# Patient Record
Sex: FEMALE | Race: WHITE | NOT HISPANIC OR LATINO | Employment: FULL TIME | URBAN - METROPOLITAN AREA
[De-identification: names, ages, dates, MRNs, and addresses within clinical notes are randomized per-mention and may not be internally consistent; named-entity substitution may affect disease eponyms.]

---

## 2017-02-22 ENCOUNTER — RECORDS - HEALTHEAST (OUTPATIENT)
Dept: GENERAL RADIOLOGY | Facility: CLINIC | Age: 47
End: 2017-02-22

## 2017-02-22 ENCOUNTER — OFFICE VISIT - HEALTHEAST (OUTPATIENT)
Dept: FAMILY MEDICINE | Facility: CLINIC | Age: 47
End: 2017-02-22

## 2017-02-22 DIAGNOSIS — M25.511 ACUTE PAIN OF RIGHT SHOULDER: ICD-10-CM

## 2017-02-22 DIAGNOSIS — M75.82 ROTATOR CUFF TENDONITIS, LEFT: ICD-10-CM

## 2017-02-22 DIAGNOSIS — M25.511 PAIN IN RIGHT SHOULDER: ICD-10-CM

## 2017-02-22 DIAGNOSIS — N87.9 DYSPLASIA OF CERVIX: ICD-10-CM

## 2017-02-22 ASSESSMENT — MIFFLIN-ST. JEOR: SCORE: 1697.5

## 2017-02-24 ENCOUNTER — COMMUNICATION - HEALTHEAST (OUTPATIENT)
Dept: FAMILY MEDICINE | Facility: CLINIC | Age: 47
End: 2017-02-24

## 2017-02-24 ENCOUNTER — OFFICE VISIT - HEALTHEAST (OUTPATIENT)
Dept: PHYSICAL THERAPY | Facility: REHABILITATION | Age: 47
End: 2017-02-24

## 2017-02-24 DIAGNOSIS — R29.3 POOR POSTURE: ICD-10-CM

## 2017-02-24 DIAGNOSIS — M25.812 SHOULDER IMPINGEMENT, LEFT: ICD-10-CM

## 2017-02-24 DIAGNOSIS — M62.81 GENERALIZED MUSCLE WEAKNESS: ICD-10-CM

## 2017-02-24 DIAGNOSIS — M77.8 LEFT SHOULDER TENDONITIS: ICD-10-CM

## 2017-02-24 DIAGNOSIS — M25.612 DECREASED ROM OF LEFT SHOULDER: ICD-10-CM

## 2017-02-27 ENCOUNTER — OFFICE VISIT - HEALTHEAST (OUTPATIENT)
Dept: PHYSICAL THERAPY | Facility: REHABILITATION | Age: 47
End: 2017-02-27

## 2017-02-27 DIAGNOSIS — M25.612 DECREASED ROM OF LEFT SHOULDER: ICD-10-CM

## 2017-02-27 DIAGNOSIS — M77.8 LEFT SHOULDER TENDONITIS: ICD-10-CM

## 2017-02-27 DIAGNOSIS — M25.812 SHOULDER IMPINGEMENT, LEFT: ICD-10-CM

## 2017-02-27 DIAGNOSIS — R29.3 POOR POSTURE: ICD-10-CM

## 2017-02-27 DIAGNOSIS — M62.81 GENERALIZED MUSCLE WEAKNESS: ICD-10-CM

## 2017-03-03 ENCOUNTER — OFFICE VISIT - HEALTHEAST (OUTPATIENT)
Dept: PHYSICAL THERAPY | Facility: REHABILITATION | Age: 47
End: 2017-03-03

## 2017-03-03 DIAGNOSIS — M77.8 LEFT SHOULDER TENDONITIS: ICD-10-CM

## 2017-03-03 DIAGNOSIS — R29.3 POOR POSTURE: ICD-10-CM

## 2017-03-03 DIAGNOSIS — M62.81 GENERALIZED MUSCLE WEAKNESS: ICD-10-CM

## 2017-03-03 DIAGNOSIS — M25.812 SHOULDER IMPINGEMENT, LEFT: ICD-10-CM

## 2017-03-03 DIAGNOSIS — M25.612 DECREASED ROM OF LEFT SHOULDER: ICD-10-CM

## 2017-03-06 ENCOUNTER — OFFICE VISIT - HEALTHEAST (OUTPATIENT)
Dept: PHYSICAL THERAPY | Facility: REHABILITATION | Age: 47
End: 2017-03-06

## 2017-03-06 DIAGNOSIS — M25.612 DECREASED ROM OF LEFT SHOULDER: ICD-10-CM

## 2017-03-06 DIAGNOSIS — M62.81 GENERALIZED MUSCLE WEAKNESS: ICD-10-CM

## 2017-03-06 DIAGNOSIS — M25.812 SHOULDER IMPINGEMENT, LEFT: ICD-10-CM

## 2017-03-06 DIAGNOSIS — M77.8 LEFT SHOULDER TENDONITIS: ICD-10-CM

## 2017-03-06 DIAGNOSIS — R29.3 POOR POSTURE: ICD-10-CM

## 2017-03-08 ENCOUNTER — OFFICE VISIT - HEALTHEAST (OUTPATIENT)
Dept: FAMILY MEDICINE | Facility: CLINIC | Age: 47
End: 2017-03-08

## 2017-03-08 DIAGNOSIS — M75.42 SHOULDER IMPINGEMENT SYNDROME, LEFT: ICD-10-CM

## 2017-03-08 ASSESSMENT — MIFFLIN-ST. JEOR: SCORE: 1679.35

## 2017-03-10 ENCOUNTER — OFFICE VISIT - HEALTHEAST (OUTPATIENT)
Dept: PHYSICAL THERAPY | Facility: REHABILITATION | Age: 47
End: 2017-03-10

## 2017-03-10 DIAGNOSIS — M77.8 LEFT SHOULDER TENDONITIS: ICD-10-CM

## 2017-03-10 DIAGNOSIS — M25.612 DECREASED ROM OF LEFT SHOULDER: ICD-10-CM

## 2017-03-10 DIAGNOSIS — M25.812 SHOULDER IMPINGEMENT, LEFT: ICD-10-CM

## 2017-03-10 DIAGNOSIS — M62.81 GENERALIZED MUSCLE WEAKNESS: ICD-10-CM

## 2017-03-10 DIAGNOSIS — R29.3 POOR POSTURE: ICD-10-CM

## 2017-03-13 ENCOUNTER — COMMUNICATION - HEALTHEAST (OUTPATIENT)
Dept: PHYSICAL THERAPY | Facility: REHABILITATION | Age: 47
End: 2017-03-13

## 2017-03-17 ENCOUNTER — OFFICE VISIT - HEALTHEAST (OUTPATIENT)
Dept: PHYSICAL THERAPY | Facility: REHABILITATION | Age: 47
End: 2017-03-17

## 2017-03-17 DIAGNOSIS — M25.812 SHOULDER IMPINGEMENT, LEFT: ICD-10-CM

## 2017-03-17 DIAGNOSIS — M62.81 GENERALIZED MUSCLE WEAKNESS: ICD-10-CM

## 2017-03-17 DIAGNOSIS — M77.8 LEFT SHOULDER TENDONITIS: ICD-10-CM

## 2017-03-17 DIAGNOSIS — M25.612 DECREASED ROM OF LEFT SHOULDER: ICD-10-CM

## 2017-03-17 DIAGNOSIS — R29.3 POOR POSTURE: ICD-10-CM

## 2017-03-20 ENCOUNTER — OFFICE VISIT - HEALTHEAST (OUTPATIENT)
Dept: PHYSICAL THERAPY | Facility: REHABILITATION | Age: 47
End: 2017-03-20

## 2017-03-20 DIAGNOSIS — M62.81 GENERALIZED MUSCLE WEAKNESS: ICD-10-CM

## 2017-03-20 DIAGNOSIS — M77.8 LEFT SHOULDER TENDONITIS: ICD-10-CM

## 2017-03-20 DIAGNOSIS — R29.3 POOR POSTURE: ICD-10-CM

## 2017-03-20 DIAGNOSIS — M25.612 DECREASED ROM OF LEFT SHOULDER: ICD-10-CM

## 2017-03-20 DIAGNOSIS — M25.812 SHOULDER IMPINGEMENT, LEFT: ICD-10-CM

## 2017-03-24 ENCOUNTER — OFFICE VISIT - HEALTHEAST (OUTPATIENT)
Dept: PHYSICAL THERAPY | Facility: REHABILITATION | Age: 47
End: 2017-03-24

## 2017-03-24 ENCOUNTER — RECORDS - HEALTHEAST (OUTPATIENT)
Dept: ADMINISTRATIVE | Facility: OTHER | Age: 47
End: 2017-03-24

## 2017-03-24 DIAGNOSIS — R29.3 POOR POSTURE: ICD-10-CM

## 2017-03-24 DIAGNOSIS — M62.81 GENERALIZED MUSCLE WEAKNESS: ICD-10-CM

## 2017-03-24 DIAGNOSIS — M25.612 DECREASED ROM OF LEFT SHOULDER: ICD-10-CM

## 2017-03-24 DIAGNOSIS — M25.812 SHOULDER IMPINGEMENT, LEFT: ICD-10-CM

## 2017-03-24 DIAGNOSIS — M77.8 LEFT SHOULDER TENDONITIS: ICD-10-CM

## 2017-03-27 ENCOUNTER — OFFICE VISIT - HEALTHEAST (OUTPATIENT)
Dept: PHYSICAL THERAPY | Facility: REHABILITATION | Age: 47
End: 2017-03-27

## 2017-03-27 ENCOUNTER — COMMUNICATION - HEALTHEAST (OUTPATIENT)
Dept: PHYSICAL THERAPY | Facility: REHABILITATION | Age: 47
End: 2017-03-27

## 2017-03-27 DIAGNOSIS — M25.612 DECREASED ROM OF LEFT SHOULDER: ICD-10-CM

## 2017-03-27 DIAGNOSIS — M25.812 SHOULDER IMPINGEMENT, LEFT: ICD-10-CM

## 2017-03-27 DIAGNOSIS — M77.8 LEFT SHOULDER TENDONITIS: ICD-10-CM

## 2017-03-27 DIAGNOSIS — R29.3 POOR POSTURE: ICD-10-CM

## 2017-03-27 DIAGNOSIS — M62.81 GENERALIZED MUSCLE WEAKNESS: ICD-10-CM

## 2017-03-31 ENCOUNTER — RECORDS - HEALTHEAST (OUTPATIENT)
Dept: GENERAL RADIOLOGY | Facility: CLINIC | Age: 47
End: 2017-03-31

## 2017-03-31 ENCOUNTER — OFFICE VISIT - HEALTHEAST (OUTPATIENT)
Dept: PHYSICAL THERAPY | Facility: REHABILITATION | Age: 47
End: 2017-03-31

## 2017-03-31 ENCOUNTER — OFFICE VISIT - HEALTHEAST (OUTPATIENT)
Dept: FAMILY MEDICINE | Facility: CLINIC | Age: 47
End: 2017-03-31

## 2017-03-31 ENCOUNTER — HOSPITAL ENCOUNTER (OUTPATIENT)
Dept: MRI IMAGING | Facility: HOSPITAL | Age: 47
Discharge: HOME | End: 2017-03-31
Attending: FAMILY MEDICINE

## 2017-03-31 DIAGNOSIS — M25.612 DECREASED ROM OF LEFT SHOULDER: ICD-10-CM

## 2017-03-31 DIAGNOSIS — M25.812 SHOULDER IMPINGEMENT, LEFT: ICD-10-CM

## 2017-03-31 DIAGNOSIS — M75.42 SHOULDER IMPINGEMENT SYNDROME, LEFT: ICD-10-CM

## 2017-03-31 DIAGNOSIS — M62.81 GENERALIZED MUSCLE WEAKNESS: ICD-10-CM

## 2017-03-31 DIAGNOSIS — T14.8XXA OTHER INJURY OF UNSPECIFIED BODY REGION: ICD-10-CM

## 2017-03-31 DIAGNOSIS — M77.8 LEFT SHOULDER TENDONITIS: ICD-10-CM

## 2017-03-31 DIAGNOSIS — R29.3 POOR POSTURE: ICD-10-CM

## 2017-03-31 DIAGNOSIS — S93.402A MODERATE LEFT ANKLE SPRAIN, INITIAL ENCOUNTER: ICD-10-CM

## 2017-03-31 DIAGNOSIS — M25.572 PAIN IN LEFT ANKLE AND JOINTS OF LEFT FOOT: ICD-10-CM

## 2017-03-31 LAB
CREAT SERPL-MCNC: 0.77 MG/DL (ref 0.6–1.1)
GFR SERPL CREATININE-BSD FRML MDRD: >60 ML/MIN/1.73M2

## 2017-03-31 ASSESSMENT — MIFFLIN-ST. JEOR: SCORE: 1692.96

## 2017-04-03 ENCOUNTER — RECORDS - HEALTHEAST (OUTPATIENT)
Dept: ADMINISTRATIVE | Facility: OTHER | Age: 47
End: 2017-04-03

## 2017-04-05 ENCOUNTER — COMMUNICATION - HEALTHEAST (OUTPATIENT)
Dept: PHYSICAL THERAPY | Facility: REHABILITATION | Age: 47
End: 2017-04-05

## 2017-05-01 ENCOUNTER — RECORDS - HEALTHEAST (OUTPATIENT)
Dept: ADMINISTRATIVE | Facility: OTHER | Age: 47
End: 2017-05-01

## 2017-07-11 ENCOUNTER — RECORDS - HEALTHEAST (OUTPATIENT)
Dept: ADMINISTRATIVE | Facility: OTHER | Age: 47
End: 2017-07-11

## 2017-09-01 ENCOUNTER — COMMUNICATION - HEALTHEAST (OUTPATIENT)
Dept: FAMILY MEDICINE | Facility: CLINIC | Age: 47
End: 2017-09-01

## 2017-09-01 ENCOUNTER — RECORDS - HEALTHEAST (OUTPATIENT)
Dept: ADMINISTRATIVE | Facility: OTHER | Age: 47
End: 2017-09-01

## 2017-09-07 ENCOUNTER — OFFICE VISIT - HEALTHEAST (OUTPATIENT)
Dept: FAMILY MEDICINE | Facility: CLINIC | Age: 47
End: 2017-09-07

## 2017-09-07 DIAGNOSIS — Z12.31 ENCOUNTER FOR MAMMOGRAM TO ESTABLISH BASELINE MAMMOGRAM: ICD-10-CM

## 2017-09-07 DIAGNOSIS — Z01.818 PREOP EXAMINATION: ICD-10-CM

## 2017-09-07 ASSESSMENT — MIFFLIN-ST. JEOR: SCORE: 1713.37

## 2017-09-11 ENCOUNTER — RECORDS - HEALTHEAST (OUTPATIENT)
Dept: ADMINISTRATIVE | Facility: OTHER | Age: 47
End: 2017-09-11

## 2017-09-26 ENCOUNTER — RECORDS - HEALTHEAST (OUTPATIENT)
Dept: ADMINISTRATIVE | Facility: OTHER | Age: 47
End: 2017-09-26

## 2017-10-24 ENCOUNTER — RECORDS - HEALTHEAST (OUTPATIENT)
Dept: ADMINISTRATIVE | Facility: OTHER | Age: 47
End: 2017-10-24

## 2017-11-16 ENCOUNTER — RECORDS - HEALTHEAST (OUTPATIENT)
Dept: ADMINISTRATIVE | Facility: OTHER | Age: 47
End: 2017-11-16

## 2017-12-06 ENCOUNTER — RECORDS - HEALTHEAST (OUTPATIENT)
Dept: ADMINISTRATIVE | Facility: OTHER | Age: 47
End: 2017-12-06

## 2018-01-17 ENCOUNTER — RECORDS - HEALTHEAST (OUTPATIENT)
Dept: ADMINISTRATIVE | Facility: OTHER | Age: 48
End: 2018-01-17

## 2018-01-19 ENCOUNTER — RECORDS - HEALTHEAST (OUTPATIENT)
Dept: ADMINISTRATIVE | Facility: OTHER | Age: 48
End: 2018-01-19

## 2018-01-25 ENCOUNTER — RECORDS - HEALTHEAST (OUTPATIENT)
Dept: ADMINISTRATIVE | Facility: OTHER | Age: 48
End: 2018-01-25

## 2018-08-14 ENCOUNTER — RECORDS - HEALTHEAST (OUTPATIENT)
Dept: GENERAL RADIOLOGY | Facility: CLINIC | Age: 48
End: 2018-08-14

## 2018-08-14 ENCOUNTER — OFFICE VISIT - HEALTHEAST (OUTPATIENT)
Dept: FAMILY MEDICINE | Facility: CLINIC | Age: 48
End: 2018-08-14

## 2018-08-14 DIAGNOSIS — M79.671 RIGHT FOOT PAIN: ICD-10-CM

## 2018-08-14 DIAGNOSIS — M79.671 PAIN IN RIGHT FOOT: ICD-10-CM

## 2018-08-14 NOTE — ASSESSMENT & PLAN NOTE
Related to prolonged standing at work  3 locations noted on feet, plantar surface of calcaneus which would be consistent with plantar fasciitis.  Exam not remarkable for this and symptoms atypical in that they worsen rather than improve with time on feet/walking.  Retrocalcaneal bursitis versus insertional Achilles tendinitis is another possibility since she has pain in this location.  Heel lifts have not helped, soft back shoes have not helped.  Finally, pain on base of fifth metatarsal as would be characteristic of peroneal tendinitis.  Indeed she does somewhat over supinate.  She also has some pain on her metatarsal heads  Plan: Work restrictions.  1 month (through 9/14) no more than 30 min/h on feet.  Referral to podiatry.  Discussed foot wear and metatarsal pad  Follow-up: As needed, 1 month

## 2018-08-15 ENCOUNTER — COMMUNICATION - HEALTHEAST (OUTPATIENT)
Dept: FAMILY MEDICINE | Facility: CLINIC | Age: 48
End: 2018-08-15

## 2018-11-06 ENCOUNTER — AMBULATORY - HEALTHEAST (OUTPATIENT)
Dept: FAMILY MEDICINE | Facility: CLINIC | Age: 48
End: 2018-11-06

## 2018-11-06 DIAGNOSIS — M79.671 RIGHT FOOT PAIN: ICD-10-CM

## 2018-11-06 DIAGNOSIS — M75.42 SHOULDER IMPINGEMENT SYNDROME, LEFT: ICD-10-CM

## 2018-11-15 ENCOUNTER — RECORDS - HEALTHEAST (OUTPATIENT)
Dept: ADMINISTRATIVE | Facility: OTHER | Age: 48
End: 2018-11-15

## 2018-11-26 ENCOUNTER — RECORDS - HEALTHEAST (OUTPATIENT)
Dept: ADMINISTRATIVE | Facility: OTHER | Age: 48
End: 2018-11-26

## 2018-12-06 ENCOUNTER — RECORDS - HEALTHEAST (OUTPATIENT)
Dept: ADMINISTRATIVE | Facility: OTHER | Age: 48
End: 2018-12-06

## 2018-12-10 ENCOUNTER — RECORDS - HEALTHEAST (OUTPATIENT)
Dept: ADMINISTRATIVE | Facility: OTHER | Age: 48
End: 2018-12-10

## 2018-12-18 ENCOUNTER — RECORDS - HEALTHEAST (OUTPATIENT)
Dept: ADMINISTRATIVE | Facility: OTHER | Age: 48
End: 2018-12-18

## 2019-01-22 ENCOUNTER — RECORDS - HEALTHEAST (OUTPATIENT)
Dept: ADMINISTRATIVE | Facility: OTHER | Age: 49
End: 2019-01-22

## 2019-04-22 ENCOUNTER — RECORDS - HEALTHEAST (OUTPATIENT)
Dept: ADMINISTRATIVE | Facility: OTHER | Age: 49
End: 2019-04-22

## 2019-05-23 ENCOUNTER — RECORDS - HEALTHEAST (OUTPATIENT)
Dept: ADMINISTRATIVE | Facility: OTHER | Age: 49
End: 2019-05-23

## 2019-06-05 ENCOUNTER — RECORDS - HEALTHEAST (OUTPATIENT)
Dept: ADMINISTRATIVE | Facility: OTHER | Age: 49
End: 2019-06-05

## 2019-09-04 ENCOUNTER — COMMUNICATION - HEALTHEAST (OUTPATIENT)
Dept: FAMILY MEDICINE | Facility: CLINIC | Age: 49
End: 2019-09-04

## 2019-09-04 ENCOUNTER — AMBULATORY - HEALTHEAST (OUTPATIENT)
Dept: FAMILY MEDICINE | Facility: CLINIC | Age: 49
End: 2019-09-04

## 2019-09-04 DIAGNOSIS — M75.42 SHOULDER IMPINGEMENT SYNDROME, LEFT: ICD-10-CM

## 2019-09-17 ENCOUNTER — RECORDS - HEALTHEAST (OUTPATIENT)
Dept: ADMINISTRATIVE | Facility: OTHER | Age: 49
End: 2019-09-17

## 2019-09-25 ENCOUNTER — RECORDS - HEALTHEAST (OUTPATIENT)
Dept: ADMINISTRATIVE | Facility: OTHER | Age: 49
End: 2019-09-25

## 2019-09-26 ENCOUNTER — RECORDS - HEALTHEAST (OUTPATIENT)
Dept: ADMINISTRATIVE | Facility: OTHER | Age: 49
End: 2019-09-26

## 2019-09-27 ENCOUNTER — AMBULATORY - HEALTHEAST (OUTPATIENT)
Dept: LAB | Facility: CLINIC | Age: 49
End: 2019-09-27

## 2019-09-27 ENCOUNTER — COMMUNICATION - HEALTHEAST (OUTPATIENT)
Dept: FAMILY MEDICINE | Facility: CLINIC | Age: 49
End: 2019-09-27

## 2019-09-27 DIAGNOSIS — Z90.49 ABSENCE OF GALLBLADDER: ICD-10-CM

## 2019-09-27 LAB
BASOPHILS # BLD AUTO: 0.1 THOU/UL (ref 0–0.2)
BASOPHILS NFR BLD AUTO: 1 % (ref 0–2)
C REACTIVE PROTEIN LHE: 0.7 MG/DL (ref 0–0.8)
EOSINOPHIL # BLD AUTO: 0.3 THOU/UL (ref 0–0.4)
EOSINOPHIL NFR BLD AUTO: 3 % (ref 0–6)
ERYTHROCYTE [DISTWIDTH] IN BLOOD BY AUTOMATED COUNT: 13.4 % (ref 11–14.5)
ERYTHROCYTE [SEDIMENTATION RATE] IN BLOOD BY WESTERGREN METHOD: 25 MM/HR (ref 0–20)
HCT VFR BLD AUTO: 35 % (ref 35–47)
HGB BLD-MCNC: 11.9 G/DL (ref 12–16)
LYMPHOCYTES # BLD AUTO: 2.7 THOU/UL (ref 0.8–4.4)
LYMPHOCYTES NFR BLD AUTO: 31 % (ref 20–40)
MCH RBC QN AUTO: 27.2 PG (ref 27–34)
MCHC RBC AUTO-ENTMCNC: 33.9 G/DL (ref 32–36)
MCV RBC AUTO: 80 FL (ref 80–100)
MONOCYTES # BLD AUTO: 0.6 THOU/UL (ref 0–0.9)
MONOCYTES NFR BLD AUTO: 7 % (ref 2–10)
NEUTROPHILS # BLD AUTO: 5.1 THOU/UL (ref 2–7.7)
NEUTROPHILS NFR BLD AUTO: 58 % (ref 50–70)
PLATELET # BLD AUTO: 358 THOU/UL (ref 140–440)
PMV BLD AUTO: 7.7 FL (ref 7–10)
RBC # BLD AUTO: 4.37 MILL/UL (ref 3.8–5.4)
WBC: 8.7 THOU/UL (ref 4–11)

## 2019-11-29 ENCOUNTER — OFFICE VISIT - HEALTHEAST (OUTPATIENT)
Dept: FAMILY MEDICINE | Facility: CLINIC | Age: 49
End: 2019-11-29

## 2019-11-29 DIAGNOSIS — R05.9 COUGH: ICD-10-CM

## 2019-11-29 DIAGNOSIS — J18.9 PNEUMONIA OF RIGHT MIDDLE LOBE DUE TO INFECTIOUS ORGANISM: ICD-10-CM

## 2019-11-29 LAB
FLUAV AG SPEC QL IA: NORMAL
FLUBV AG SPEC QL IA: NORMAL

## 2019-11-29 RX ORDER — GABAPENTIN 300 MG/1
300 CAPSULE ORAL 3 TIMES DAILY
Refills: 3 | Status: SHIPPED | COMMUNITY
Start: 2019-11-27 | End: 2023-02-06

## 2019-12-02 ENCOUNTER — OFFICE VISIT - HEALTHEAST (OUTPATIENT)
Dept: FAMILY MEDICINE | Facility: CLINIC | Age: 49
End: 2019-12-02

## 2019-12-02 DIAGNOSIS — R05.9 COUGH: ICD-10-CM

## 2019-12-02 DIAGNOSIS — J18.9 PNEUMONIA DUE TO INFECTIOUS ORGANISM, UNSPECIFIED LATERALITY, UNSPECIFIED PART OF LUNG: ICD-10-CM

## 2019-12-02 DIAGNOSIS — J06.9 ACUTE URI: ICD-10-CM

## 2019-12-02 LAB
BASOPHILS # BLD AUTO: 0.1 THOU/UL (ref 0–0.2)
BASOPHILS NFR BLD AUTO: 1 % (ref 0–2)
C REACTIVE PROTEIN LHE: 0.2 MG/DL (ref 0–0.8)
EOSINOPHIL # BLD AUTO: 0.1 THOU/UL (ref 0–0.4)
EOSINOPHIL NFR BLD AUTO: 2 % (ref 0–6)
ERYTHROCYTE [DISTWIDTH] IN BLOOD BY AUTOMATED COUNT: 13.4 % (ref 11–14.5)
HCT VFR BLD AUTO: 36.9 % (ref 35–47)
HGB BLD-MCNC: 12.3 G/DL (ref 12–16)
LYMPHOCYTES # BLD AUTO: 2.6 THOU/UL (ref 0.8–4.4)
LYMPHOCYTES NFR BLD AUTO: 45 % (ref 20–40)
MCH RBC QN AUTO: 26.9 PG (ref 27–34)
MCHC RBC AUTO-ENTMCNC: 33.4 G/DL (ref 32–36)
MCV RBC AUTO: 81 FL (ref 80–100)
MONOCYTES # BLD AUTO: 0.5 THOU/UL (ref 0–0.9)
MONOCYTES NFR BLD AUTO: 9 % (ref 2–10)
NEUTROPHILS # BLD AUTO: 2.6 THOU/UL (ref 2–7.7)
NEUTROPHILS NFR BLD AUTO: 44 % (ref 50–70)
PLATELET # BLD AUTO: 324 THOU/UL (ref 140–440)
PMV BLD AUTO: 7.6 FL (ref 7–10)
RBC # BLD AUTO: 4.57 MILL/UL (ref 3.8–5.4)
WBC: 5.9 THOU/UL (ref 4–11)

## 2020-05-19 ENCOUNTER — OFFICE VISIT - HEALTHEAST (OUTPATIENT)
Dept: FAMILY MEDICINE | Facility: CLINIC | Age: 50
End: 2020-05-19

## 2020-05-19 DIAGNOSIS — B35.2 TINEA MANUUM: ICD-10-CM

## 2020-05-19 RX ORDER — TRIAMCINOLONE ACETONIDE 1 MG/G
CREAM TOPICAL
Qty: 45 G | Refills: 0 | Status: SHIPPED | OUTPATIENT
Start: 2020-05-19 | End: 2023-02-06

## 2020-10-27 ENCOUNTER — RECORDS - HEALTHEAST (OUTPATIENT)
Dept: ADMINISTRATIVE | Facility: OTHER | Age: 50
End: 2020-10-27

## 2020-11-09 ENCOUNTER — RECORDS - HEALTHEAST (OUTPATIENT)
Dept: ADMINISTRATIVE | Facility: OTHER | Age: 50
End: 2020-11-09

## 2021-05-25 ENCOUNTER — RECORDS - HEALTHEAST (OUTPATIENT)
Dept: ADMINISTRATIVE | Facility: CLINIC | Age: 51
End: 2021-05-25

## 2021-05-26 ENCOUNTER — RECORDS - HEALTHEAST (OUTPATIENT)
Dept: ADMINISTRATIVE | Facility: CLINIC | Age: 51
End: 2021-05-26

## 2021-05-27 ENCOUNTER — RECORDS - HEALTHEAST (OUTPATIENT)
Dept: ADMINISTRATIVE | Facility: CLINIC | Age: 51
End: 2021-05-27

## 2021-05-28 ENCOUNTER — RECORDS - HEALTHEAST (OUTPATIENT)
Dept: ADMINISTRATIVE | Facility: CLINIC | Age: 51
End: 2021-05-28

## 2021-05-30 ENCOUNTER — RECORDS - HEALTHEAST (OUTPATIENT)
Dept: ADMINISTRATIVE | Facility: CLINIC | Age: 51
End: 2021-05-30

## 2021-05-30 VITALS — WEIGHT: 224 LBS | BODY MASS INDEX: 33.18 KG/M2 | HEIGHT: 69 IN

## 2021-05-30 VITALS — HEIGHT: 69 IN | WEIGHT: 220 LBS | BODY MASS INDEX: 32.58 KG/M2

## 2021-05-30 VITALS — BODY MASS INDEX: 33.03 KG/M2 | WEIGHT: 223 LBS | HEIGHT: 69 IN

## 2021-05-31 VITALS — BODY MASS INDEX: 32.07 KG/M2 | WEIGHT: 224 LBS | HEIGHT: 70 IN

## 2021-06-01 ENCOUNTER — RECORDS - HEALTHEAST (OUTPATIENT)
Dept: ADMINISTRATIVE | Facility: CLINIC | Age: 51
End: 2021-06-01

## 2021-06-01 VITALS — BODY MASS INDEX: 34.17 KG/M2 | WEIGHT: 234.75 LBS

## 2021-06-01 NOTE — TELEPHONE ENCOUNTER
Vern, I am pretty sure I put in a referral for this yesterday.  Let me know if that is not the case

## 2021-06-01 NOTE — TELEPHONE ENCOUNTER
Referral Request  Type of referral: orthopedics - speciality   Who s requesting: Patient  Why the request: Patient has been seeing [facility] for her left shoulder and is unhappy with the care/response.    Patient is now looking to changes to [facility] for the left shoulder issue.  States saw her pcp about 2+ years ago for this issue  Have you been seen for this request: No - writer offered to scheduled, which patient declined to do.  Last OFV 08/14/18  Does patient have a preference on a group/provider? [facility]  Okay to leave a detailed message?  Yes

## 2021-06-01 NOTE — TELEPHONE ENCOUNTER
,  Pt is calling asking for a referral to [facility] for her left shoulder issue. She was being seen at [facility] but wishes to go [facility] as she is unhappy with [facility].  Thank you   Arlen

## 2021-06-01 NOTE — TELEPHONE ENCOUNTER
Upcoming Appointment Question  When is the appointment: Today  What is your appointment for?: Labs-cbc with diff, crp, esr, Ordering provider is [facility], Patient hand delivering order  Who is your appointment scheduled with?: [facility]  What is your question/concern?: Patient hand delivering order for today's lab visit  Okay to leave a detailed message?: No return call needed

## 2021-06-02 ENCOUNTER — RECORDS - HEALTHEAST (OUTPATIENT)
Dept: ADMINISTRATIVE | Facility: CLINIC | Age: 51
End: 2021-06-02

## 2021-06-03 NOTE — PROGRESS NOTES
Assessment/Plan:         Sharon was seen today for fatigue.    Diagnoses and all orders for this visit:    Pneumonia of right middle lobe due to infectious organism (H): acute onset of feeling hot/cold/fatigue, body aches, shortness of breath. Influenza negative and no exposure (not immunized). Her exam was concerning for focal crackles and there is a corresponding possible opacity noted by radiology in the right middle lobe. Given the corresponding findings and her history, I think it is reasonable to treat this as a pneumonia. She has tachycardia but no other symptoms suggestive of PE and no risk factors. She does not have chest pain or pleuritic pain. Discussed concerning symptoms for which to return.  -     doxycycline (VIBRA-TABS) 100 MG tablet; Take 1 tablet (100 mg total) by mouth 2 (two) times a day for 10 days.    Cough  -     XR Chest 2 Views; Future  -     Influenza A/B Rapid Test- Nasal Swab  -     XR Chest 2 Views      IMPRESSION:     Questionable minimal opacities over the middle lobe and/or lingula on the  lateral view versus vascular markings and artifact; subtle or early pneumonia  cannot be excluded. This cannot be verified on the frontal view.     Otherwise, negative chest. Remaining lungs clear.            Plan of care was discussed with the patient and/or guardian. They verbalize understanding of the treatment options and plan of care.    Melissa Patterson       Subjective:        Sharon Viveros is a 49 y.o. female who presents for cough, fatigue, shortness of breath, low appetite.   Symptoms started Tuesday.   She feels hot and cold. Has a wet cough, feels like she can't get a deep breath because she coughs. Doesn't have any chest pain or palpitations. No pain with inspiration. Did briefly have a sharp pain under her right breast when she sneezed yesterday, but that has not recurred.   She feels exhausted, has body aches, chills, just no appetite.  Is drinking fluids.   No diarrhea or  vomiting.   She did not get a flu shot (never does).      At baseline, she is healthy. Has some chronic neuropathy for which she takes gabapentin, but no other medications.  Does not smoke  No history of asthma or other lung conditions.  No history of pneumonia.        Objective:       Blood pressure 110/78, pulse (!) 111, temperature 98.4  F (36.9  C), temperature source Oral, resp. rate 20, weight (!) 229 lb 6.4 oz (104.1 kg), SpO2 99 %, not currently breastfeeding.   Gen: tired appearing, no acute distress.  Card: RRR, no murmur, normal S1/S2. No pedal edema.  Resp: there are crackles in the right middle and lower lung spaces. No wheeze or other focal findings. Good air movement throughout both lung fields.     Results for orders placed or performed in visit on 11/29/19   Influenza A/B Rapid Test- Nasal Swab   Result Value Ref Range    Influenza  A, Rapid Antigen No Influenza A antigen detected No Influenza A antigen detected    Influenza B, Rapid Antigen No Influenza B antigen detected No Influenza B antigen detected

## 2021-06-03 NOTE — PROGRESS NOTES
Subjective:  49 y.o. female with concerns follow-up to possible right middle lobe pneumonia that was noted at walk-in care on November 29.  She is now had symptoms for about 7 days and is taking doxycycline for 5 of those.  She does not feel like she is getting any better.  She does not feel like she is getting any worse.  Has fairly significant cough.  Cannot go to work because of the cough.  Mild dyspnea.  No chest pain.  No fevers or chills.  Denies sinus symptoms or ear pain.  She has no sore throat.  She has had some diarrhea which started before she started the antibiotics.  She has not had fevers, chills, myalgias, or arthralgias.    Outpatient Medications Prior to Visit   Medication Sig Dispense Refill     doxycycline (VIBRA-TABS) 100 MG tablet Take 1 tablet (100 mg total) by mouth 2 (two) times a day for 10 days. 20 tablet 0     gabapentin (NEURONTIN) 300 MG capsule Take 300 mg by mouth 3 (three) times a day.  3     No facility-administered medications prior to visit.       Social History     Tobacco Use   Smoking Status Never Smoker   Smokeless Tobacco Never Used      Objective:  /80 (Patient Site: Left Arm, Patient Position: Sitting, Cuff Size: Adult Large)   Pulse 92   Temp 98.2  F (36.8  C) (Oral)   Resp 20   Wt (!) 233 lb (105.7 kg)   SpO2 99%   BMI 33.91 kg/m    GENERAL: alert, not distressed, coughs somewhat frequently  EYES: PERRL/EOMI, no scleral icterus, no conjunctival injection  EARS: normal tympanic membranes and external auditory canals bilaterally  PHARYNX: no erythema or exudates  MOUTH: well hydrated mucosa, no lesions  NECK: no lymphadenopathy or thyroid nodules  CHEST: clear, no rales, rhonchi, or wheezes  CARDIAC: regular without murmur, gallop, or rub  ABDOMEN: soft, non tender, non distended, normal bowel sounds    Recent Results (from the past 24 hour(s))   HM1 (CBC with Diff)   Result Value Ref Range    WBC 5.9 4.0 - 11.0 thou/uL    RBC 4.57 3.80 - 5.40 mill/uL     Hemoglobin 12.3 12.0 - 16.0 g/dL    Hematocrit 36.9 35.0 - 47.0 %    MCV 81 80 - 100 fL    MCH 26.9 (L) 27.0 - 34.0 pg    MCHC 33.4 32.0 - 36.0 g/dL    RDW 13.4 11.0 - 14.5 %    Platelets 324 140 - 440 thou/uL    MPV 7.6 7.0 - 10.0 fL    Neutrophils % 44 (L) 50 - 70 %    Lymphocytes % 45 (H) 20 - 40 %    Monocytes % 9 2 - 10 %    Eosinophils % 2 0 - 6 %    Basophils % 1 0 - 2 %    Neutrophils Absolute 2.6 2.0 - 7.7 thou/uL    Lymphocytes Absolute 2.6 0.8 - 4.4 thou/uL    Monocytes Absolute 0.5 0.0 - 0.9 thou/uL    Eosinophils Absolute 0.1 0.0 - 0.4 thou/uL    Basophils Absolute 0.1 0.0 - 0.2 thou/uL      Chest x-ray:  Personally reviewed.  No infiltrates.    Assessment and Plan:   1. Cough  2. Pneumonia due to infectious organism, unspecified laterality, unspecified part of lung  3. Acute URI  At this point I would continue the doxycycline until prescription has run out.  Discussed that her cough may have a viral cause in which there would be no benefit to the doxycycline.  Diarrhea may be worsened by antibiotics.  NSAIDs have some benefit.  Cough medicines shown to have not much benefit.    Symptomatic therapy suggested: push fluids and maintain hydration, rest, and return office visit prn if symptoms persist or worsen.  Call or return to clinic next week if these symptoms worsen or fail to improve as anticipated--sooner if worsening.  - naproxen (NAPROSYN) 500 MG tablet; Take 1 tablet (500 mg total) by mouth 2 (two) times a day with meals for 10 days.  Dispense: 20 tablet; Refill: 3

## 2021-06-04 VITALS
SYSTOLIC BLOOD PRESSURE: 110 MMHG | RESPIRATION RATE: 20 BRPM | WEIGHT: 229.4 LBS | HEART RATE: 111 BPM | OXYGEN SATURATION: 99 % | TEMPERATURE: 98.4 F | BODY MASS INDEX: 33.39 KG/M2 | DIASTOLIC BLOOD PRESSURE: 78 MMHG

## 2021-06-04 VITALS
SYSTOLIC BLOOD PRESSURE: 112 MMHG | RESPIRATION RATE: 20 BRPM | OXYGEN SATURATION: 99 % | WEIGHT: 233 LBS | BODY MASS INDEX: 33.91 KG/M2 | TEMPERATURE: 98.2 F | DIASTOLIC BLOOD PRESSURE: 80 MMHG | HEART RATE: 92 BPM

## 2021-06-08 NOTE — PROGRESS NOTES
"Sharon Viveros is a 50 y.o. female who is being evaluated via a billable video visit.      The patient has been notified of following:     \"This video visit will be conducted via a call between you and your physician/provider. We have found that certain health care needs can be provided without the need for an in-person physical exam.  This service lets us provide the care you need with a video conversation.  If a prescription is necessary we can send it directly to your pharmacy.  If lab work is needed we can place an order for that and you can then stop by our lab to have the test done at a later time.    Video visits are billed at different rates depending on your insurance coverage. Please reach out to your insurance provider with any questions.    If during the course of the call the physician/provider feels a video visit is not appropriate, you will not be charged for this service.\"    Patient has given verbal consent to a Video visit? Yes    Patient would like to receive their AVS by AVS Preference: [place].    Patient would like the video invitation sent by: Text to cell phone: [phone]    Will anyone else be joining your video visit? No        Video Start Time: 0810    Additional provider notes:   Subjective:  50 y.o. female with concerns of skin rash on hands.  Present for 1 to 2 months.  Has clear bumps on the outside of the rash.  There is microscopic.  Some redness.  Seems to be extending from mid palm to base of hand and into webspaces.  Very itchy.  Has been applying clotrimazole.  That has not seemed to be helpful.    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    Outpatient Medications Prior to Visit   Medication Sig Dispense Refill     gabapentin (NEURONTIN) 300 MG capsule Take 300 mg by mouth 3 (three) times a day.  3     No facility-administered medications prior to visit.       Social History     Tobacco Use   Smoking Status Never Smoker   Smokeless " Tobacco Never Used      Objective:  There were no vitals taken for this visit.  GEN: Alert not distressed   RESP: No audible wheezing, shortness of breath, or cough   SKIN: Through video visit able to appreciate a fairly well demarcated area on the palm of hand.  Raised border.  Potential micro vesicles noted but difficult to tell.      Assessment and Plan:   1. Tinea manuum  Despite no effect from clotrimazole cream I still think this is likely a dermatophyte infection.  Will treat with a different class of antifungals.  Add some steroid cream for itch relief.    Recommended follow-up if were not having good improvement in a couple weeks.    Video-Visit Details    Type of service:  Video Visit    Video End Time (time video stopped): 0815  Originating Location (pt. Location): Home    Distant Location (provider location):  [facility]     Platform used for Video Visit: Meera Rhodes MD

## 2021-06-09 NOTE — PROGRESS NOTES
"[facility] Daily Progress     Patient Name: Sharon Viveros  Date: 3/3/2017  Visit #: 3  PTA visit #:  1  Referral Diagnosis:   Acute pain of right shoulder [M25.511]  - Primary       Rotator cuff tendonitis, left [M75.82]         Referring provider: Conrad Castillo, *  Visit Diagnosis:     ICD-10-CM    1. Shoulder impingement, left M75.42    2. Left shoulder tendonitis M75.82    3. Poor posture R29.3    4. Decreased ROM of left shoulder M25.612    5. Generalized muscle weakness M62.81          Assessment:   Pt continues to have pain with L shoulder movements.   Tender L upper traps and L pec major.  HEP/POC compliance is  good .  Response to Intervention Pt reports increased pain to 6/10 with exercise. MT did not change pain. Limited in L shoulder flexion and abd due to pain.   Patient is appropriate to continue with skilled physical therapy intervention, as indicated by initial plan of care.    Goal Status: on-going  Pt. will be independent with home exercise program in : 4 weeks (to self-manage pain and improve function): Ongoing  Pt. will have improved quality of sleep: waking less times/night;in 12 weeks: Ongoing  Patient will reach / maintain arm movement: overhead;for home chores;for dressing;with full ROM;with less pain;in 12 weeks (less than 3/10 pain): Progressing towards      Plan / Patient Education:     Continue with initial plan of care.  Progress with home program as tolerated.    Progress AAROM ex and strengthening as able  Review HEP.    Subjective:     Pain Ratin L shoulder pain  Pt her L shoulder is \"sore.\"  Pt states that the pain is constant.  Pt reports compliancy with her HEP and icing 2x/day.    Objective:     R shoulder AAROM: 115 deg of flexion, 90 deg of abduction    R shoulder AROM (at end of session): 115 deg flexion, 90 deg of abd   ER: 72 degrees  Functional IR: thumb to L3 with increased pain    Treatment Today     TREATMENT MINUTES COMMENTS   Evaluation   "   Self-care/ Home management     Manual therapy 15 STM/MFR L upper traps,L pec major and L   Gentle GH distraction   Neuromuscular Re-education     Therapeutic Activity     Therapeutic Exercises 12 -see exercise flow sheet   Gait training     Modality__________________                Total 26    Blank areas are intentional and mean the treatment did not include these items.       Mylene Perales, PTA,CLT  3/3/2017

## 2021-06-09 NOTE — PROGRESS NOTES
"[facility] Daily Progress     Patient Name: Sharon Viveros  Date: 3/24/2017  Visit #:8  PTA visit #:  4-   Referral Diagnosis:   Acute pain of right shoulder [M25.511]  - Primary       Rotator cuff tendonitis, left [M75.82]         Referring provider: Conrad Castillo, *  Visit Diagnosis:   No diagnosis found.      Assessment:   Limited tolerance to all strengthening exercises today.  ROM remains unchanged from last measurements.  Tender L supraspinatus tender and posterior shoulder.  HEP/POC compliance is  good .  Response to Intervention Pt reports limited improvement with PT and injection thus far. Have trialed manual therapy (joint mobilizations, STM, CFM, stretches) and HEP including stretching, postural and RC strengthening.  Patient is appropriate to continue with skilled physical therapy intervention, as indicated by initial plan of care.      Goal Status: on-going  Pt. will be independent with home exercise program in : 4 weeks (to self-manage pain and improve function): Ongoing  Pt. will have improved quality of sleep: waking less times/night;in 12 weeks: Ongoing  Patient will reach / maintain arm movement: overhead;for home chores;for dressing;with full ROM;with less pain;in 12 weeks (less than 3/10 pain): Progressing towards      Plan / Patient Education:     Continue with initial plan of care.  Progress with home program as tolerated.    Progress RC strengthening as able   Next follow up with MD is on .    Subjective:     Pain Ratin/10  Pt reports that her shoulder is painful again when turning the steering wheel, lying on her L side. \" I feel like I am back to where I was before the injection. Pt reports she lifted her L arm to point at something and this increased the pain.  Pt has been working 70 hours/week. She states that her day job does not bother her. 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    Objective:     L shoulder AROM "   Flexion: 135 degrees with pain  AB:155 degrees with pain  Functional ER: to C6 with pain  Functional IR: thumb to T10 with increased pain    Treatment Today     TREATMENT MINUTES COMMENTS   Evaluation     Self-care/ Home management     Manual therapy 10 CFM to L supraspinatus, MFR/STM to L ant and posterior shoulder   Neuromuscular Re-education     Therapeutic Activity     Therapeutic Exercises 20 -see exercise flow sheet for ex's performed today   -UBE x4 min, F, WL 2.0     Gait training     Modality__________________                Total 30    Blank areas are intentional and mean the treatment did not include these items.       Mylene Perales, PTA,CLT  3/24/2017

## 2021-06-09 NOTE — PROGRESS NOTES
[facility] Daily Progress     Patient Name: Sharon Viveros  Date: 3/6/2017  Visit #: 4  PTA visit #:  1- na prior session  Referral Diagnosis:   Acute pain of right shoulder [M25.511]  - Primary       Rotator cuff tendonitis, left [M75.82]         Referring provider: Conrad Castillo, *  Visit Diagnosis:     ICD-10-CM    1. Shoulder impingement, left M75.42    2. Left shoulder tendonitis M75.82    3. Poor posture R29.3    4. Decreased ROM of left shoulder M25.612    5. Generalized muscle weakness M62.81          Assessment:   Pt continues to have pain with increased L shoulder movements.   Tender L upper traps, L supraspinatus tendon and L pec major.  HEP/POC compliance is  good .  Response to Intervention Pt reports increased pain after work yesterday (up to 10/10). Unable to tolerated any progression of HEP and decreased L shoulder AROM flexion today due to pain. Placed order to try iontophoresis and discussed tx options as pt is returning to MD on Wednesday.  Patient is appropriate to continue with skilled physical therapy intervention, as indicated by initial plan of care.    Goal Status: on-going  Pt. will be independent with home exercise program in : 4 weeks (to self-manage pain and improve function): Ongoing  Pt. will have improved quality of sleep: waking less times/night;in 12 weeks: Ongoing  Patient will reach / maintain arm movement: overhead;for home chores;for dressing;with full ROM;with less pain;in 12 weeks (less than 3/10 pain): Progressing towards      Plan / Patient Education:     Continue with initial plan of care.  Progress with home program as tolerated.    Progress AAROM ex and strengthening as able  Limited progress thus far with PT- discussed options as pt is returning to MD on Wednesday- possible injection and/or trial of iontophoresis with order placed.    Subjective:     Pain Ratin L anterior shoulder pain (last night 10/10)  Pt reports her L shoulder is more  painful last night and today. 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    Objective:     R shoulder AROM (at end of session): 100 (was at 115 deg at last visit) deg flexion, 93 deg of abd   ER: 72 degrees  Functional IR: thumb to L3 with increased pain    Treatment Today     TREATMENT MINUTES COMMENTS   Evaluation     Self-care/ Home management     Manual therapy 10 STM/MFR L upper trapezius,L pec major and L supraspinatus with mod to severe TTP   -Gentle L GH distraction   Neuromuscular Re-education 10 -KT to L shoulder 2 Y strips, one starting at distal medial deltoid and wrapping anterior/posterior around glenohumeral joint; one starting at AC joint and stretching posteriorly with active scapular retraction; skin prepped and reviewed indications/precautions   Therapeutic Activity     Therapeutic Exercises 8 -see exercise flow sheet  -verbal review of HEP   Gait training     Modality__________________                Total 28    Blank areas are intentional and mean the treatment did not include these items.       Mimi Holcomb, PT, DPT  3/6/2017

## 2021-06-09 NOTE — PROGRESS NOTES
46-year-old female comes in for follow-up on left shoulder pain.  She continues to have pain pretty much unchanged since last seen.  Perhaps is a little improvement in range of motion  Has been seeing physical therapist regularly.    Pain is in the superior portion of her left shoulder and somewhat anterior.  Hurts to do simple things - like lift things even below her chest at her job.  She wonders if wasn't hitting her  when he was snoring that originally caused this.  It's been present for almost 2 months    Exam today shows mild before meals joint tenderness, less than last time.  She is mostly glenohumeral tenderness, some tenderness with unresisted abduction.    Discussed options for treatment.  Recommend injection at this point even though there isn't good long-term benefit.  She is somewhat desperate for short-term improvement.    Discussed risk of infection.  1 mL of lidocaine and 1 mL of Depo-Medrol 40 given from subacromial approach posteriorly.  This is tolerated well.    Discussed other options including orthopedics referral.  She is going to be getting iontophoresis from physical therapy.  She'll call us in 2 weeks if not improving

## 2021-06-09 NOTE — PROGRESS NOTES
"[facility] Daily Progress     Patient Name: Sharon Viveros  Date: 3/20/2017  Visit #: 7  PTA visit #:  3- na prior session  Referral Diagnosis:   Acute pain of right shoulder [M25.511]  - Primary       Rotator cuff tendonitis, left [M75.82]         Referring provider: Conrad Castillo, *  Visit Diagnosis:     ICD-10-CM    1. Shoulder impingement, left M75.42    2. Left shoulder tendonitis M75.82    3. Poor posture R29.3    4. Decreased ROM of left shoulder M25.612    5. Generalized muscle weakness M62.81          Assessment:     HEP/POC compliance is  good .  Response to Intervention Pt reports limited improvement with PT and injection thus far. Have trialed manual therapy (joint mobilizations, STM, CFM, stretches) and HEP including stretching, postural and RC strengthening.  Patient is appropriate to continue with skilled physical therapy intervention, as indicated by initial plan of care.  However, pt is showing significant improvements in L shoulder AROM.    Goal Status: on-going  Pt. will be independent with home exercise program in : 4 weeks (to self-manage pain and improve function): Ongoing  Pt. will have improved quality of sleep: waking less times/night;in 12 weeks: Ongoing  Patient will reach / maintain arm movement: overhead;for home chores;for dressing;with full ROM;with less pain;in 12 weeks (less than 3/10 pain): Progressing towards      Plan / Patient Education:     Continue with initial plan of care.  Progress with home program as tolerated.    Progress RC strengthening as able   Next follow up with MD is on .    Subjective:     Pain Ratin/10  Pt reports only a \"tiny bit\" of pain relief with previous injection (about 2 weeks ago). Pain is still constant and increases with what she is doing at work. Has been trying to lift a little more at work (able to lift 1 gallon milks with pain).    Objective:     L shoulder AROM   Flexion: 135 degrees with pain  AB:155 degrees " with pain  Functional ER: to C6 with pain  Functional IR: thumb to T10 with increased pain    Treatment Today     TREATMENT MINUTES COMMENTS   Evaluation     Self-care/ Home management     Manual therapy     Neuromuscular Re-education     Therapeutic Activity     Therapeutic Exercises 27 -see exercise flow sheet   -UBE x4 min, F, WL 2.0  -discussed progress- AROM has improved, limited pain relief with injection thus far; could trial iontophoresis in future.  -educated on completed CFM at home   Gait training     Modality__________________                Total 27    Blank areas are intentional and mean the treatment did not include these items.       Mimi Holcomb, PT,DPT  3/20/2017

## 2021-06-09 NOTE — PROGRESS NOTES
[facility]   Shoulder Initial Evaluation    Patient Name: Sharon Viveros  Date of evaluation: 2/24/2017  Referral Diagnosis:   Acute pain of right shoulder [M25.511]  - Primary       Rotator cuff tendonitis, left [M75.82]         Referring provider: Conrad Castillo, *  Visit Diagnosis:     ICD-10-CM    1. Shoulder impingement, left M75.42    2. Left shoulder tendonitis M75.82    3. Poor posture R29.3    4. Decreased ROM of left shoulder M25.612    5. Generalized muscle weakness M62.81        Assessment:      Impairments in  pain, posture, ROM, joint mobility, strength, motor function, ADL's  Patient's signs and symptoms are consistent with left shoulder impingement and tendonitis. Pt presents with pain with L shoulder AROM, decreased UE strength on L, + impingement testing on L, and poor UE posture. Her pain started about 2 months ago. It is aggravated by sleep, overhead reaching, dressing and lifting..  The POC is dynamic and will be modified on an ongoing basis.  Barriers to achieving goals as noted in the assessment section may affect outcome.  Prognosis to achieve goals is  good   Skilled PT is required to reduce pain and improve function.  Pt. is appropriate for skilled PT intervention as outlined in the Plan of Care (POC).  Pt. is a good candidate for skilled PT services to improve pain levels and function.      Goals:  Pt. will be independent with home exercise program in : 4 weeks (to self-manage pain and improve function)  Pt. will have improved quality of sleep: waking less times/night;in 12 weeks  Patient will reach / maintain arm movement: overhead;for home chores;for dressing;with full ROM;with less pain;in 12 weeks (less than 3/10 pain)  No Data Recorded    Patient's expectations/goals are realistic.    Barriers to Learning or Achieving Goals:  No Barriers.       Plan / Patient Instructions:        Plan of Care:   Communication with: Referral Source  Patient Related Instruction:  Nature of Condition;Treatment plan and rationale;Self Care instruction;Basis of treatment;Body mechanics;Expected outcome;Precautions;Posture;Next steps  Times per Week: 1-2  Number of Weeks: 12  Number of Visits: 12  Therapeutic Exercise: ROM;Stretching;Strengthening  Neuromuscular Reeducation: kinesio tape;posture;core;TNE  Manual Therapy: soft tissue mobilization;myofascial release;joint mobilization;muscle energy  Modalities: electrical stimulation;TENS;iontophoresis;ultrasound;cold pack;hot pack (as needed for pain)  Equipment: theraband    POC and pathology of condition were reviewed with patient.  Pt. is in agreement with the Plan of Care  A Home Exercise Program (HEP) was initiated today.  Pt. was instructed in exercises by PT and patient was given a handout with detailed instructions.    Plan for next visit: complete SPADI, try KT or joint mobs/STM, progress ROM and strengthening exercises  .   Subjective:       Social information:   Occupation:[employer]   Work Status:Working full time      History of Present Illness:    Sharon is a 46 y.o. female who presents to therapy today with complaints of L shoulder pain. Date of onset/duration of symptoms is about 2 months (started in 2017). No fall or injury. Onset was gradual. 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 Symptoms are constant and getting worse. She denies history of similar symptoms. She describes their previous level of function as not limited    Pain Ratin L anterior shoulder  Pain rating at best: 3  Pain rating at worst: 10  Pain description: burning and sharp    Functional limitations are described as occurring with:   Sleep  Change sleeping position  Reach into cupboard  Groom/dress  Wash/bath/shower  Carry laundry/groceries      Patient reports benefit from:  rest  , icy hot- no change; ice- no change       Objective:      Note: Items left blank indicates the item was not performed or not indicated  at the time of the evaluation.      Shoulder Examination  1. Shoulder impingement, left     2. Left shoulder tendonitis     3. Poor posture     4. Decreased ROM of left shoulder     5. Generalized muscle weakness       Precautions/Restrictions: None  Involved side: Left  Posture Observation:      General sitting posture is  poor.  Cervical:  Mild forward head, mod forward shoulders  Cervical Clearing: Abnormal pain with cervical rotation R and L and R lateral flexion (into upper trapezius muscle), neg spurlings leisa    Shoulder/Elbow ROM    Date: 2/24/2017     Shoulder and Elbow ROM ( )   AROM in degrees AROM in degrees AROM in degrees    Right Left Right Left Right Left   Shoulder Flexion (0-180 )  with pain       Shoulder Abduction (0-180 ) WNL 85 with pain       Shoulder Extension (0-60 )         Shoulder ER (0-90 ) To T2  To L upper trapezius       Shoulder IR (0-70 ) To T10 To beltline with pain       Shoulder IR/Ext         Elbow Flexion (150 )         Elbow Extension (0 )          PROM in degrees PROM in degrees PROM in degrees    Right Left Right Left Right Left   Shoulder Flexion (0-180 )  ~150 in seated       Shoulder Abduction (0-180 )         Shoulder Extension (0-60 )         Shoulder ER (0-90 )         Shoulder IR (0-70 )         Elbow Flexion (150 )         Elbow Extension (0 )           Shoulder/Elbow Strength   Date: 2/24/2017     Shoulder/Elbow Strength (/5)  Manual Muscle Test (MMT) MMT MMT MMT    Right Left Right Left Right Left   Shoulder Flexion 5 3+ with pain       Supraspinatus         Shoulder Abduction 5 4 with pain       Shoulder Extension         Shoulder External Rotation 3+ 3+ with pain       Shoulder Internal Rotation 5 4 with pain       Elbow Flexion 5 4 with pain       Elbow Extension 5 5       Other:         Other:             Palpation:  Tender at R pec minor, subscapularis and upper trapezius      Shoulder Special Tests     Impingement Cluster Right (+/-) Left (+/-) Rotator  Cuff Tests Right (+/-) Left (+/-)   Richter-Germán  + Drop Arm Sign  -   Painful Arc  + Hornblowers     Infraspinatus Test  + ERLS  -   AC Tests Right (+/-) Left (+/-) IRLS  -   Active Compression   Labral Tests Right (+/-) Left (+/-)   Crossbody Adduction   Biceps Load Test II     AC Resisted Extension   Jerk Test     GH Instability Tests Right (+/-) Left (+/-) Gin Test     Sulcus Sign   Biceps Tests Right (+/-) Left (+/-)   Apprehension   Speed     Relocation   Ruma august     Other:   Other:     Other:   Other:         Treatment Today  Pt arrived 7 min late and completed paperwork. Appt was started 20 min late.  TREATMENT MINUTES COMMENTS   Evaluation 20    Self-care/ Home management     Manual therapy     Neuromuscular Re-education     Therapeutic Activity     Therapeutic Exercises 10 -see exercise flow sheet  -educated on rest and ice for 20 min several times a day  -educated on sleeping with pillow under left arm   Gait training     Modality____CP______________ 10 (NC) -CP applied to L shoulder in seated              Total 40    Blank areas are intentional and mean the treatment did not include these items.     PT Evaluation Code: (Please list factors)  Patient History/Comorbidities: none  Examination: left shoulder  Clinical Presentation: stable  Clinical Decision Making: low    Patient History/  Comorbidities Examination  (body structures and functions, activity limitations, and/or participation restrictions) Clinical Presentation Clinical Decision Making (Complexity)   No documented Comorbidities or personal factors 1-2 Elements Stable and/or uncomplicated Low   1-2 documented comorbidities or personal factor 3 Elements Evolving clinical presentation with changing characteristics Moderate   3-4 documented comorbidities or personal factors 4 or more Unstable and unpredictable High                Mimi Holcomb, PT, DPT  2/24/2017  9:10 AM

## 2021-06-09 NOTE — PROGRESS NOTES
"[facility] Daily Progress     Patient Name: Sharon Viveros  Date: 3/17/2017  Visit #: 6  PTA visit #:  3  Referral Diagnosis:   Acute pain of right shoulder [M25.511]  - Primary       Rotator cuff tendonitis, left [M75.82]         Referring provider: Conrad Castillo, *  Visit Diagnosis:     ICD-10-CM    1. Shoulder impingement, left M75.42    2. Left shoulder tendonitis M75.82    3. Poor posture R29.3    4. Decreased ROM of left shoulder M25.612    5. Generalized muscle weakness M62.81          Assessment:   Tender L upper traps, supraspinatus and infraspinatus tendons.  Pt tolerated strengthening exercises that were added today.  HEP/POC compliance is  good .  Response to Intervention Pt reports increased pain after work yesterday (up to 10/10). Unable to tolerated any progression of HEP and decreased L shoulder AROM flexion today due to pain. Placed order to try iontophoresis and discussed tx options as pt is returning to MD on Wednesday.  Patient is appropriate to continue with skilled physical therapy intervention, as indicated by initial plan of care.    Goal Status: on-going  Pt. will be independent with home exercise program in : 4 weeks (to self-manage pain and improve function): Ongoing  Pt. will have improved quality of sleep: waking less times/night;in 12 weeks: Ongoing  Patient will reach / maintain arm movement: overhead;for home chores;for dressing;with full ROM;with less pain;in 12 weeks (less than 3/10 pain): Progressing towards      Plan / Patient Education:     Continue with initial plan of care.  Progress with home program as tolerated.    Progress AAROM ex and strengthening as able   Pt was instructed to be consistent with ice.  Pt to follow up with her MD next week.       Subjective:    Pt reports she pushed her self up out of bed with her L arm \" it didn't feel so good.\" Pt states her shoulder was feeling a little better prior to that.  Pt reports compliancy with her HEP " and using ice.  Pain Ratin/10      Objective:     L shoulder AROM   Flexion: 125 degrees  AB:132 degrees  ER: 72 degrees  Functional IR: thumb to T12 with increased pain    Treatment Today     TREATMENT MINUTES COMMENTS   Evaluation     Self-care/ Home management     Manual therapy 15 STM/MFR L upper trapezius,L pec major and L supraspinatus with mod to severe TTP   -Gentle L GH distraction   Neuromuscular Re-education  Did not apply K tape today, pt declined, she is not sure if it is helping. Will re assess next visit.   Therapeutic Activity     Therapeutic Exercises 15 -see exercise flow sheet, added to HEP : rows L2 band x10 and B ER L1 band x10.     Gait training     Modality__________________                Total 30    Blank areas are intentional and mean the treatment did not include these items.       Mylene Perales, PTA,CLT  3/17/2017

## 2021-06-09 NOTE — PROGRESS NOTES
[facility] Daily Progress     Patient Name: Sharon Viveros  Date: 3/31/2017  Visit #:10  PTA visit #:  4- na prior session  Referral Diagnosis:   Acute pain of right shoulder [M25.511]  - Primary       Rotator cuff tendonitis, left [M75.82]         Referring provider: Conrad Castillo, *  Visit Diagnosis:     ICD-10-CM    1. Shoulder impingement, left M75.42    2. Left shoulder tendonitis M75.82    3. Poor posture R29.3    4. Decreased ROM of left shoulder M25.612    5. Generalized muscle weakness M62.81          Assessment:     HEP/POC compliance is  good .  Response to Intervention No change in pain with first ionto treatment.  Patient is appropriate to continue with skilled physical therapy intervention, as indicated by initial plan of care.  Thus far, no significant change in pain but improvements in L shoulder AROM and strength.    Goal Status: on-going  Pt. will be independent with home exercise program in : 4 weeks (to self-manage pain and improve function): Ongoing  Pt. will have improved quality of sleep: waking less times/night;in 12 weeks: Ongoing  Patient will reach / maintain arm movement: overhead;for home chores;for dressing;with full ROM;with less pain;in 12 weeks (less than 3/10 pain): Progressing towards      Plan / Patient Education:     Continue with initial plan of care.  Progress with home program as tolerated.    Progress RC strengthening as able; assess response to iontophoresis      Subjective:     Pain Ratin/10    Pt did not notice any change in her shoulder pain with first ionto treatment. She rolled onto her shoulder on Tuesday night and had immediate pain that caused her to cry. Has follow up with MD today.    Objective:     L shoulder AROM   Flexion: 135 degrees with pain  AB:155 degrees with pain  Functional ER: to C6 with pain  Functional IR: thumb to T10 with increased pain    Shoulder/Elbow ROM    Date: 2017 3/31/2017      Shoulder and Elbow ROM ( )    AROM in degrees AROM in degrees AROM in degrees    Right Left Right Left Right Left   Shoulder Flexion (0-180 )  with pain  with pain     Shoulder Abduction (0-180 ) WNL 85 with pain  with pain     Shoulder Extension (0-60 )         Shoulder ER (0-90 ) To T2  To L upper trapezius To T2 To T2 with pain     Shoulder IR (0-70 ) To T10 To beltline with pain To T10 To T12 with pain     Shoulder IR/Ext         Elbow Flexion (150 )         Elbow Extension (0 )          PROM in degrees PROM in degrees PROM in degrees    Right Left Right Left Right Left   Shoulder Flexion (0-180 )  ~150 in seated       Shoulder Abduction (0-180 )         Shoulder Extension (0-60 )         Shoulder ER (0-90 )         Shoulder IR (0-70 )         Elbow Flexion (150 )         Elbow Extension (0 )           Shoulder/Elbow Strength   Date: 2/24/2017 3/31/2017      Shoulder/Elbow Strength (/5)  Manual Muscle Test (MMT) MMT MMT MMT    Right Left Right Left Right Left   Shoulder Flexion 5 3+ with pain 5 4 with pain     Supraspinatus         Shoulder Abduction 5 4 with pain 5 4+ with pain     Shoulder Extension         Shoulder External Rotation 3+ 3+ with pain 4+ 4  With pain     Shoulder Internal Rotation 5 4 with pain 5 5 with pain     Elbow Flexion 5 4 with pain 5 4+ with pain     Elbow Extension 5 5 5 5     Other:         Other:           Shoulder Special Tests  Impingement Cluster Right (+/-) Left (+/-) Rotator Cuff Tests Right (+/-) Left (+/-)   Richter-Germán  - Drop Arm Sign  -   Painful Arc  + Hornblowers     Infraspinatus Test  - ERLS  -   AC Tests Right (+/-) Left (+/-) IRLS  +   Active Compression   Labral Tests Right (+/-) Left (+/-)   Crossbody Adduction   Biceps Load Test II     AC Resisted Extension   Jerk Test     GH Instability Tests Right (+/-) Left (+/-) Gin Test     Sulcus Sign   Biceps Tests Right (+/-) Left (+/-)   Apprehension   Speed  -   Relocation   Ruma august     Other:   Other:     Other:   Other:            Tender: L subscapularis    Treatment Today     TREATMENT MINUTES COMMENTS   Evaluation     Self-care/ Home management     Manual therapy     Neuromuscular Re-education     Therapeutic Activity     Therapeutic Exercises 15 -reassessed objective  -discussed progress - improvements in strength and ROM but no significant change in pain   -UBE x4 min, F, WL 0.0     Gait training     Modality__________________ 10 -Iontophoresis treatment #2 with 1.3 ml dexamethasone and saline, 80 mA-minutes, 6 hour wear time patch applied to L anterior shoulder, skin prepped; reviewed indications/precautions.              Total 25    Blank areas are intentional and mean the treatment did not include these items.       Mimi Holcomb, PT, DPT  3/31/2017     [facility] Discharge Summary    Patient was seen for 10 visits from 2/24/17 to 3/31/17 with 2 missed appointments and 1 cancelled appt.  Patient received a home program scapular and RC strengthening  The patient discontinued therapy, did not return.  Pt was not making progress with PT. Trialed exercise, rest, 1 session of iontophoresis, MT, ice without any significant relief of symptoms. Pt was instructed to return to referring provider.    Therapy will be discontinued at this time.  The patient will need a new referral to resume.    Thank you for your referral.  Mimi Holcomb, PT, DPT  5/1/2017  3:09 PM

## 2021-06-09 NOTE — PROGRESS NOTES
[facility] Daily Progress     Patient Name: Sharon Viveros  Date: 3/27/2017  Visit #:9  PTA visit #:  4-   Referral Diagnosis:   Acute pain of right shoulder [M25.511]  - Primary       Rotator cuff tendonitis, left [M75.82]         Referring provider: Conrad Castillo, *  Visit Diagnosis:     ICD-10-CM    1. Shoulder impingement, left M75.42    2. Left shoulder tendonitis M75.82    3. Poor posture R29.3    4. Decreased ROM of left shoulder M25.612    5. Generalized muscle weakness M62.81          Assessment:     HEP/POC compliance is  good .  Response to Intervention Limited improvement and ability to progress HEP as pt's pain has increased again since injection. Trialed iontophoresis today and pt will continue to follow up with PCP.'  Patient is appropriate to continue with skilled physical therapy intervention, as indicated by initial plan of care.  Thus far, STM and MT have increased pain without any delayed decrease in pain.    Goal Status: on-going  Pt. will be independent with home exercise program in : 4 weeks (to self-manage pain and improve function): Ongoing  Pt. will have improved quality of sleep: waking less times/night;in 12 weeks: Ongoing  Patient will reach / maintain arm movement: overhead;for home chores;for dressing;with full ROM;with less pain;in 12 weeks (less than 3/10 pain): Progressing towards      Plan / Patient Education:     Continue with initial plan of care.  Progress with home program as tolerated.    Progress RC strengthening as able; assess response to iontophoresis  Next follow up with MD is on .    Subjective:     Pain Ratin/10    Pt still feels her shoulder is about the same as prior to the cortisone injection (pain levels). Pt rested her shoulder all weekend because she rolled her ankle. Working at [employer] still increases her pain (5 hours a day during the week, 7-8 hours per day on the weekend).  Has f/u this Friday with MD.    Objective:     L  shoulder AROM   Flexion: 135 degrees with pain  AB:155 degrees with pain  Functional ER: to C6 with pain  Functional IR: thumb to T10 with increased pain    Treatment Today     TREATMENT MINUTES COMMENTS   Evaluation     Self-care/ Home management     Manual therapy     Neuromuscular Re-education     Therapeutic Activity     Therapeutic Exercises 15 -see exercise flow sheet for ex's performed today   -UBE x4 min, F, WL 1.5     Gait training     Modality__________________ 10 -Iontophoresis treatment #1 with 1.3 ml dexamethasone and saline, 6 hour wear time patch applied to L anterior shoulder, skin prepped; reviewed indications/precautions.              Total 25    Blank areas are intentional and mean the treatment did not include these items.       Mimi Holcomb, PT, DPT  3/27/2017

## 2021-06-09 NOTE — PROGRESS NOTES
[facility] Daily Progress     Patient Name: Sharon Viveros  Date: 2017  Visit #: 2  PTA visit #:  na  Referral Diagnosis:   Acute pain of right shoulder [M25.511]  - Primary       Rotator cuff tendonitis, left [M75.82]         Referring provider: Conrad Castillo, *  Visit Diagnosis:     ICD-10-CM    1. Shoulder impingement, left M75.42    2. Left shoulder tendonitis M75.82    3. Poor posture R29.3    4. Decreased ROM of left shoulder M25.612    5. Generalized muscle weakness M62.81          Assessment:     HEP/POC compliance is  good .  Response to Intervention Pt reports increased pain to 6/10 with exercise. MT did not change pain. Limited in L shoulder flexion and abd due to pain.   Patient is appropriate to continue with skilled physical therapy intervention, as indicated by initial plan of care.    Goal Status: on-going  Pt. will be independent with home exercise program in : 4 weeks (to self-manage pain and improve function)  Pt. will have improved quality of sleep: waking less times/night;in 12 weeks  Patient will reach / maintain arm movement: overhead;for home chores;for dressing;with full ROM;with less pain;in 12 weeks (less than 3/10 pain)  No Data Recorded    Plan / Patient Education:     Continue with initial plan of care.  Progress with home program as tolerated. progress AAROM ex and strengthening as able    Subjective:     Pain Ratin L shoulder pain  Pt was pretty sore the rest of the day after her initial evaluation. She felt a little better Saturday and  because she was not working. Exercises x1/day- do not increase pain.      Objective:     R shoulder AAROM: 96 deg of flexion, 90 deg of abduction    R shoulder AROM (at end of session): 90 deg flexion, 85 deg of abd with pt reporting less pain    Treatment Today     TREATMENT MINUTES COMMENTS   Evaluation     Self-care/ Home management     Manual therapy 13 -supine L glenohumeral joint distraction, grade II, 4x10  sec oscillations, x3 reps in 40 and 50 deg of abd  -supine L glenohumeral inferior and posterior joint mobilizations, grade II, in 40 deg of abd, x30 sec oscillations  -supine STM to L upper trapezius with tenderness noted   Neuromuscular Re-education     Therapeutic Activity     Therapeutic Exercises 12 -see exercise flow sheet   Gait training     Modality__________________                Total 25    Blank areas are intentional and mean the treatment did not include these items.       Mimi Holcomb, PT, DPT  2/27/2017

## 2021-06-09 NOTE — PROGRESS NOTES
Assessment/ Plan  1. Acute pain of left shoulder  Most likely related to rotator cuff tendinopathy.  Some question as to whether this represents before meals joint pathology as her before meals joint is quite tender.  I don't see a lot of evidence for this ismael, await radiology over read.  Will start with standard physical therapy.  Prescribed Tylenol 3 for severe pain.  Filled out a work restriction form that will be scanned on the chart.  Asked her to follow-up in 2-3 weeks  - Ambulatory referral to Physical Therapy    2. Rotator cuff tendonitis, left  As above  - Ambulatory referral to Physical Therapy    3. Cervical Dysplasia  Needs to schedule Pap in near future    Body mass index is 33.56 kg/(m^2).    Subjective  CC:  Chief Complaint   Patient presents with     Shoulder Pain     left side, 1 month, pain rates 8/10     HPI:  L Shoulder Pain  Narrative: Gradual onset of left shoulder pain, with movement.  No history of injury.  Notes:  Duration/ timing of onset: 1 month  Location of pain ant superior    Severity/ Quality: some ache, sharp  Modifying factors: Make it worse- coming down form lifting high   Make it better- not anything  Clicking or popping? no  History of shoulder problems/injury or previous work-up/ treatment? No    Reviewed patient's last 2 Pap smear.  She had positive HPV 2/15/11 and saw specialist.  I do not have the results of colposcopy present.  Either follow-up Pap smear here 2/6/13 with negative HPV.  Because she has not had 3 negative Pap smears, I think she should have another follow-up now, more than 3 years later.  Recommend she set up an appointment for this in the near future.  PFSH:  Current medications reviewed as follows:  No current outpatient prescriptions on file prior to visit.     No current facility-administered medications on file prior to visit.      Patient Active Problem List   Diagnosis     Esophageal Reflux     Normal Delivery     Chest Pain     Cervical Dysplasia      "Sprain Of The Left Hip     Itching (Pruritus)     History   Smoking Status     Never Smoker   Smokeless Tobacco     Not on file     Social History     Social History Narrative     Patient Care Team:  Conrad Castillo MD as PCP - General  ROS  Full 10 system review including constitutional, respiratory, cardiac, gi, urinary, rheumatologic, neurologic, reproductive, dermatologic psychiatric is  performed (via questionnaire) and is negative       Objective  Physical Exam  Vitals:    02/22/17 0817   BP: 110/82   Patient Site: Right Arm   Patient Position: Sitting   Cuff Size: Adult Regular   Pulse: 68   Resp: 20   Temp: 98.6  F (37  C)   TempSrc: Oral   Weight: (!) 224 lb (101.6 kg)   Height: 5' 8.5\" (1.74 m)     Pleasant, overweight, female, no distress.  Symmetric musculature of shoulder muscles and scapular muscles when viewed from moderate pain to palpation on AC joint, no pain on coracoid process and glenohumeral joint on affected shoulder.  Passive range of motion to 90  is normal and pain-free.  Negative apprehension test.    There is moderate pain on resisted internal rotation and decreased strength.  There is moderate pain on resisted external rotation and normal strength  Empty can sign is positive    Diagnostics  X-ray personally reviewed, no significant abnormalities noted.  Some sclerosis of the distal clavicle at the joint    Please note: Voice recognition software was used in this dictation.  It may therefore contain typographical errors.    "

## 2021-06-09 NOTE — PROGRESS NOTES
[facility] Daily Progress     Patient Name: Sharon Viveros  Date: 3/10/2017  Visit #: 5  PTA visit #:  2- na prior session  Referral Diagnosis:   Acute pain of right shoulder [M25.511]  - Primary       Rotator cuff tendonitis, left [M75.82]         Referring provider: Conrad Castillo, *  Visit Diagnosis:     ICD-10-CM    1. Shoulder impingement, left M75.42    2. Left shoulder tendonitis M75.82    3. Poor posture R29.3    4. Decreased ROM of left shoulder M25.612    5. Generalized muscle weakness M62.81          Assessment:   Pt continues to have pain with increased L shoulder movements.   Tender L upper traps, L supraspinatus tendon and L pec major.  HEP/POC compliance is  good .  Response to Intervention Pt reports increased pain after work yesterday (up to 10/10). Unable to tolerated any progression of HEP and decreased L shoulder AROM flexion today due to pain. Placed order to try iontophoresis and discussed tx options as pt is returning to [place] on Wednesday.  Patient is appropriate to continue with skilled physical therapy intervention, as indicated by initial plan of care.    Goal Status: on-going  Pt. will be independent with home exercise program in : 4 weeks (to self-manage pain and improve function): Ongoing  Pt. will have improved quality of sleep: waking less times/night;in 12 weeks: Ongoing  Patient will reach / maintain arm movement: overhead;for home chores;for dressing;with full ROM;with less pain;in 12 weeks (less than 3/10 pain): Progressing towards      Plan / Patient Education:     Continue with initial plan of care.  Progress with home program as tolerated.    Progress AAROM ex and strengthening as able   Pt was instructed to be consistent with ice.      Subjective:    Pt reports she had a cortisone injection on Wednesday. She states it was very sore afterward.   Pain Rating: 3/10      Objective:     R shoulder AROM   Flexion: 125 degrees  AB:132 degrees  ER: 72  degrees  Functional IR: thumb to T12 with increased pain    Treatment Today     TREATMENT MINUTES COMMENTS   Evaluation     Self-care/ Home management     Manual therapy 10 STM/MFR L upper trapezius,L pec major and L supraspinatus with mod to severe TTP   -Gentle L GH distraction   Neuromuscular Re-education 5 -KT to L shoulder 2 Y strips, one starting at distal medial deltoid and wrapping anterior/posterior around glenohumeral joint; one starting at AC joint and stretching posteriorly with active scapular retraction; skin prepped and reviewed indications/precautions   Therapeutic Activity     Therapeutic Exercises 10 -see exercise flow sheet     Gait training     Modality__________________                Total 25    Blank areas are intentional and mean the treatment did not include these items.       Mylene Perales, PTA,CLT  3/10/2017

## 2021-06-09 NOTE — PROGRESS NOTES
Assessment/ Plan  1. Shoulder impingement syndrome, left  Ongoing symptoms.  Atypical since most of the pain seems to come from AC joint as noted initial visit.  - Creatinine  - Ambulatory referral to Orthopedics    2. Moderate left ankle sprain, initial encounter  Atypical ulcer, predominance of pain from lateral malleolus though x-ray exam is negative    Body mass index is 33.41 kg/(m^2).    Subjective  CC:  Chief Complaint   Patient presents with     Shoulder Pain     follow up     HPI:  LAnkle pain  Narrative: walking- in [place]  Notes:  Duration/ timing of onset: 1 week ago  Location of pain lat ankle    Severity/ Quality: Moderate to severe pain  Modifying factors: Make it worse-walking make it better-rest though it hurts a great deal  History of ankle problems/injury or previous work-up/ treatment?  No  Clicking or popping?  No  Swelling?  Mild    Ongoing, severe left shoulder pain as described before, worse with movement of shoulder.  PFSH:  Current medications reviewed as follows:  Current Outpatient Prescriptions on File Prior to Visit   Medication Sig     acetaminophen-codeine (TYLENOL #3) 300-30 mg per tablet Take 1-2 tablets by mouth every 4 (four) hours as needed for pain.     No current facility-administered medications on file prior to visit.      Patient Active Problem List    Diagnosis Date Noted     Esophageal Reflux      Overview Note:     Created by Conversion         Normal Delivery      Overview Note:     Created by Conversion         Chest Pain      Overview Note:     Created by Conversion         Cervical Dysplasia      Overview Note:     Created by Conversion    Replacement Utility updated for latest IMO load       Sprain Of The Left Hip      Overview Note:     Created by Conversion         Itching (Pruritus)      Overview Note:     Created by Conversion    Replacement Utility updated for latest IMO load       History   Smoking Status     Never Smoker   Smokeless Tobacco     Not on file  "    Social History     Social History Narrative     Patient Care Team:  Conrad Castillo MD as PCP - General  ROS  Get of her other joint symptoms, skin symptoms,    Objective  Physical Exam  Vitals:    03/31/17 0808   BP: 112/70   Patient Site: Left Arm   Patient Position: Sitting   Cuff Size: Adult Regular   Pulse: 84   Resp: 20   Temp: 97.3  F (36.3  C)   TempSrc: Oral   Weight: (!) 223 lb (101.2 kg)   Height: 5' 8.5\" (1.74 m)     Left ankle is examined.  There is severe tenderness over the lateral malleolus.  No significant tenderness over the anterior, inferior or posterior talofibular ligament area.  Drawer test is negative.  Squeeze test is negative the calcaneus.  There is no significant tenderness over the Achilles tendon.  There is medial no medial tenderness.  Squeeze test of tib-fib region is negative.  X-ray personally reviewed and is negative for fracture.    Brief exam of the left shoulder accomplished with significant tenderness over the AC joint as before.  Diagnostics  X-ray as noted.    Please note: Voice recognition software was used in this dictation.  It may therefore contain typographical errors.      "

## 2021-06-12 NOTE — PROGRESS NOTES
[facility] PRE-OPERATIVE VISIT FOR:    Sharon Viveros,  1970, MRN [id number]    Upcoming surgery date: 17  Surgeon: Jacques  Surgery Facility: [facility]    Chief Complaint: Preop for left shoulder arthroscopy, acromioplasty and distal clavicular resection    PCP: Conrad Castillo MD, [phone]    SUBJECTIVE:  History of Present Illness:   Sharon Viveros is a 47 y.o. female with a history of persistent shoulder pain.  Began this last winter, seen first in my office 17 with shoulder pain.  Pain was severe, exacerbated with movement.  Failed conservative treatment.  Her to have fairly extensive, severe AC joint arthritis.  Met with orthopedist, plan as above.    Past Medical History:  Patient Active Problem List   Diagnosis     Esophageal Reflux     Normal Delivery     Chest Pain     Cervical Dysplasia     Sprain Of The Left Hip     Itching (Pruritus)     Past Surgical History:   Procedure Laterality Date     TX REMOVAL GALLBLADDER      Description: Cholecystectomy;  Proc Date: 1999;  Comments: during pregnancy     History of bleeding: No    History of tobacco use: No    History of anesthesia reactions: No    Social History:  she  reports that she has never smoked. She does not have any smokeless tobacco history on file.    Family History:  Family History   Problem Relation Age of Onset     Ovarian cancer Mother      Breast cancer Mother 62     Breast cancer Maternal Grandmother      Breast cancer Other      great-aunt (maternal grandmother's sister)     Breast cancer Other      great-aunt (maternal grandmother's sister)     Past Surgical History:   Procedure Laterality Date     TX REMOVAL GALLBLADDER      Description: Cholecystectomy;  Proc Date: 1999;  Comments: during pregnancy   Patient also had Essure placed for birth control  Current Medications:  Current Outpatient Prescriptions   Medication Sig Dispense Refill     acetaminophen-codeine (TYLENOL #3)  300-30 mg per tablet Take 1-2 tablets by mouth every 4 (four) hours as needed for pain. 30 tablet 0     No current facility-administered medications for this visit.        Allergies:  Prochlorperazine edisylate    Review or Systems:  Constitution: normal  HEENT: normal  Pulmonary: normal  Cardiovascular: normal  GI: normal  : normal  Musculoskeletal: normal  Neuro: normal  Endocrine: normal  Psych: normal  Lymph/Heme: normal    OBJECTIVE:  Vitals:    09/07/17 0824   BP: 112/64   Pulse: 94   Resp: 20   Temp: 97.4  F (36.3  C)   SpO2: 95%     General Appearance:    Alert,  no distress, appears stated age   Head:    Normocephalic, without obvious abnormality, atraumatic   Eyes:    PERRL, conjunctiva/corneas clear, EOM's intact   Nose:   Mucosa moist, normal    Throat:   Lips, mucosa, and tongue normal; ora phaynx clear   Neck:   Supple, symmetrical, trachea midline, no adenopathy;     thyroid:  no enlargement/tenderness/nodules; no carotid    bruit or JVD   Lungs:     Clear to auscultation bilaterally, respirations unlabored    Heart:    Regular rate and rhythm, S1 and S2 normal, no murmur, rub   or gallop   Abdomen:     Soft, non-tender, bowel sounds active all four quadrants,     no masses, no organomegaly   Extremities:   Extremities normal, atraumatic, no cyanosis or edema       Skin:   Skin color, texture, turgor normal, no rashes or lesions   Neurologic:   No focal deficits         Labs:  Results for orders placed or performed in visit on 09/07/17   Hemoglobin   Result Value Ref Range    Hemoglobin 12.1 12.0 - 16.0 g/dL   Pregnancy (Beta-hCG, Qual), Urine   Result Value Ref Range    Pregnancy Test, Urine Negative Negative    Specific Gravity, UA 1.020 1.001 - 1.030       ASSESSMENT/PLAN:  1. Preop examination       Acceptable surgical candidate.  No special recommendations.      Conrad Castillo MD

## 2021-06-16 PROBLEM — M79.671 RIGHT FOOT PAIN: Status: ACTIVE | Noted: 2018-08-14

## 2021-06-17 NOTE — PATIENT INSTRUCTIONS - HE
Patient Instructions by Melissa Patterson MD at 11/29/2019 11:50 AM     Author: Melissa Patterson MD Service: -- Author Type: Physician    Filed: 11/29/2019  1:38 PM Encounter Date: 11/29/2019 Status: Addendum    : Melissa Patterson MD (Physician)    Related Notes: Original Note by Melissa Patterson MD (Physician) filed at 11/29/2019  1:36 PM         Patient Education     Switch your ranitidine (Zantac) to Famotidine (Pepcid) since there is a recall.    If you are getting worse, having chest pain or shortness of breath that is worse or when you are at rest, you should go to the ER.         Pneumonia (Adult)  Pneumonia is an infection deep within the lungs. It is in the small air sacs (alveoli). Pneumonia may be caused by a virus or bacteria. Pneumonia caused by bacteria is usually treated with an antibiotic. Severe cases may need to be treated in the hospital. Milder cases can be treated at home. Symptoms usually start to get better during the first 2 days of treatment.    Home care  Follow these guidelines when caring for yourself at home:    Rest at home for the first 2 to 3 days, or until you feel stronger. Dont let yourself get overly tired when you go back to your activities.    Stay away from cigarette smoke - yours or other peoples.    You may use acetaminophen or ibuprofen to control fever or pain, unless another medicine was prescribed. If you have chronic liver or kidney disease, talk with your healthcare provider before using these medicines. Also talk with your provider if youve had a stomach ulcer or gastrointestinal bleeding. Dont give aspirin to anyone younger than 18 years of age who is ill with a fever. It may cause severe liver damage.    Your appetite may be poor, so a light diet is fine.    Drink 6 to 8 glasses of fluids every day to make sure you are getting enough fluids. Beverages can include water, sport drinks, sodas without caffeine, juices, tea, or soup. Fluids will help  loosen secretions in the lung. This will make it easier for you to cough up the phlegm (sputum). If you also have heart or kidney disease, check with your healthcare provider before you drink extra fluids.    Take antibiotic medicine prescribed until it is all gone, even if you are feeling better after a few days.  Follow-up care  Follow up with your healthcare provider in the next 2 to 3 days, or as advised. This is to be sure the medicine is helping you get better.  If you are 65 or older, you should get a pneumococcal vaccine and a yearly flu (influenza) shot. You should also get these vaccines if you have chronic lung disease like asthma, emphysema, or COPD. Recently, a second type of pneumonia vaccine has become available for everyone over 65 years old. This is in addition to the previous vaccine. Ask your provider about this.  When to seek medical advice  Call your healthcare provider right away if any of these occur:    You dont get better within the first 48 hours of treatment    Shortness of breath gets worse    Rapid breathing (more than 25 breaths per minute)    Coughing up blood    Chest pain gets worse with breathing    Fever of 100.4 F (38 C) or higher that doesnt get better with fever medicine    Weakness, dizziness, or fainting that gets worse    Thirst or dry mouth that gets worse    Sinus pain, headache, or a stiff neck    Chest pain not caused by coughing  Date Last Reviewed: 1/1/2017 2000-2017 [employer]. [address] [place] [id number]. All rights reserved. This information is not intended as a substitute for professional medical care. Always follow your healthcare professional's instructions.

## 2021-06-19 NOTE — LETTER
Letter by Carson Rhodes MD at      Author: Carson Rhodes MD Service: -- Author Type: --    Filed:  Encounter Date: 12/2/2019 Status: Signed         December 2, 2019     Patient: Sharon Viveros   YOB: 1970   Date of Visit: 12/2/2019       To Whom It May Concern:    Please excuse Ms. Sharon Viveros from work on Nov 26 through Dec 7 due to illness.    She might be able to return to work as scheduled on Dec 6, but if she is still feeling sick, I would recommend that she stay home.    If you have any questions or concerns, please don't hesitate to call.    Sincerely,          Electronically signed by Carson Rhodes MD   12/2/2019, 4:05 PM

## 2021-06-19 NOTE — PROGRESS NOTES
Assessment/ Plan  Problem List Items Addressed This Visit        Unprioritized    Right foot pain - Primary     Related to prolonged standing at work  3 locations noted on feet, plantar surface of calcaneus which would be consistent with plantar fasciitis.  Exam not remarkable for this and symptoms atypical in that they worsen rather than improve with time on feet/walking.  Retrocalcaneal bursitis versus insertional Achilles tendinitis is another possibility since she has pain in this location.  Heel lifts have not helped, soft back shoes have not helped.  Finally, pain on base of fifth metatarsal as would be characteristic of peroneal tendinitis.  Indeed she does somewhat over supinate.  She also has some pain on her metatarsal heads  Plan: Work restrictions.  1 month (through 9/14) no more than 30 min/h on feet.  Referral to podiatry.  Discussed foot wear and metatarsal pad  Follow-up: As needed, 1 month               Relevant Orders    XR Foot Right 3 or More VWS    Ambulatory referral to Podiatry        Subjective  CC:  Chief Complaint   Patient presents with     Foot Pain     Right foot pain - After being on it for hours, she can no longer walk on it. x1 month - Pain in outside and heal      HPI:  r Foot pain    Duration/ timing of onset: 1 month  Hurts only after a long time on feet at [employer] shift- then takes a while to get better.  Location of pain OUTSIDE AND HEAL    Severity/ Quality: sharp when standing on it  Modifying factors: Make it worse- long term wt bearing on walking   Make it better- has tried many different shoes and an insert- no relief  History of foot problems/injury or previous work-up/ treatment? Ankle sprain L- still hurts   Swelling? maybe        PFSH:  Patient Active Problem List   Diagnosis     Esophageal Reflux     Normal Delivery     Chest Pain     Cervical Dysplasia     Sprain Of The Left Hip     Itching (Pruritus)     Right foot pain     Current medications reviewed as  follows:  Current Outpatient Prescriptions on File Prior to Visit   Medication Sig     [DISCONTINUED] acetaminophen-codeine (TYLENOL #3) 300-30 mg per tablet Take 1-2 tablets by mouth every 4 (four) hours as needed for pain.     No current facility-administered medications on file prior to visit.      History   Smoking Status     Never Smoker   Smokeless Tobacco     Never Used     Social History     Social History Narrative     Patient Care Team:  Conrad Castillo MD as PCP - General  ROS  Negative as above      Objective  Physical Exam  Vitals:    08/14/18 1602   BP: 122/78   Patient Site: Left Arm   Patient Position: Sitting   Cuff Size: Adult Large   Pulse: 96   Resp: 16   Temp: 99.1  F (37.3  C)   TempSrc: Oral   Weight: (!) 234 lb 12 oz (106.5 kg)     Body mass index is 34.17 kg/(m^2).  Inspection of the foot is normal, no tenderness or abnormality noted on examination of the ankle and foot except as follows:  Calcaneal squeeze test is very positive  Mild pain base of 5th Metatarsal  Mild pain insertion of achilles  Mild tenderness at plantar surface of third metatarsal head  Diagnostics:   Personally reviewed foot x-ray which is negative      Please note: Voice recognition software was used in this dictation.  It may therefore contain typographical errors.

## 2021-07-03 NOTE — ADDENDUM NOTE
Addendum Note by Conrad Castillo MD at 3/22/2017  3:33 PM     Author: Conrad Castillo MD Service: -- Author Type: Physician    Filed: 3/22/2017  3:33 PM Encounter Date: 3/8/2017 Status: Signed    : Conrad Castillo MD (Physician)    Addended by: CONRAD CASTILLO on: 3/22/2017 03:33 PM        Modules accepted: Orders

## 2021-07-03 NOTE — ADDENDUM NOTE
Addendum Note by Carmen Pugh CMA at 3/22/2017 11:02 AM     Author: Carmen Pugh CMA Service: -- Author Type: Certified Medical Assistant    Filed: 3/22/2017 11:02 AM Encounter Date: 3/8/2017 Status: Signed    : Carmen Pugh CMA (Certified Medical Assistant)    Addended by: CARMEN PUGH on: 3/22/2017 11:02 AM        Modules accepted: Orders

## 2021-08-21 ENCOUNTER — HEALTH MAINTENANCE LETTER (OUTPATIENT)
Age: 51
End: 2021-08-21

## 2021-10-16 ENCOUNTER — HEALTH MAINTENANCE LETTER (OUTPATIENT)
Age: 51
End: 2021-10-16

## 2021-11-19 ENCOUNTER — OFFICE VISIT (OUTPATIENT)
Dept: FAMILY MEDICINE | Facility: CLINIC | Age: 51
End: 2021-11-19
Payer: OTHER MISCELLANEOUS

## 2021-11-19 VITALS
SYSTOLIC BLOOD PRESSURE: 130 MMHG | TEMPERATURE: 98.6 F | HEART RATE: 90 BPM | OXYGEN SATURATION: 98 % | DIASTOLIC BLOOD PRESSURE: 85 MMHG

## 2021-11-19 DIAGNOSIS — S89.92XA INJURY OF LEFT KNEE, INITIAL ENCOUNTER: Primary | ICD-10-CM

## 2021-11-19 PROCEDURE — 99214 OFFICE O/P EST MOD 30 MIN: CPT | Performed by: STUDENT IN AN ORGANIZED HEALTH CARE EDUCATION/TRAINING PROGRAM

## 2021-11-19 RX ORDER — MELOXICAM 7.5 MG/1
7.5 TABLET ORAL DAILY
Qty: 30 TABLET | Refills: 0 | Status: SHIPPED | OUTPATIENT
Start: 2021-11-19 | End: 2023-02-06

## 2021-11-19 NOTE — LETTER
RETURN TO WORK/SCHOOL FORM    11/19/2021    Re: Sharon Viveros  1970      To Whom It May Concern:     Sharon Viveros was seen in clinic today..  She may return to work with restrictions on 12/1/21 pending MRI results.         Restrictions:  limited to light duty - lifting no greater than 0 pounds      Carson Mariee MD  11/19/2021 6:03 PM

## 2021-11-19 NOTE — LETTER
RETURN TO WORK/SCHOOL FORM    11/19/2021    Re: Sharon Viveros  1970      To Whom It May Concern:     Sharon Viveros was seen in clinic today. She sustained an acute knee injury that is currently under investigation. She may return to work with restrictions on 11/20/21.         Restrictions:  limited to light duty - lifting no greater than 0 pounds      Carson Mariee MD  11/19/2021 8:47 PM

## 2021-11-20 NOTE — PROGRESS NOTES
Assessment & Plan     Injury of left knee, initial encounter  Given the mechanism of injury, physical findings positive for Ember along with clicking and catching during extension, with significant valgus varus elicited pain and pain with flexion concern for meniscal tear versus ACL versus MCL strain versus patellar subluxation versus LCL MCL pathology. Initial plain film of the left knee shows no fracture no dislocation and no acute joint effusion. Will treat with meloxicam decrease inflammation and follow-up MRI of left knee on December 1. Follow-up with sports medicine for results and further intervention.   - Make weightbearing as tolerated, turn to activities as tolerated.  - XR Knee Left 1/2 Views; Future  - XR Knee Left 1/2 Views  - meloxicam (MOBIC) 7.5 MG tablet; Take 1 tablet (7.5 mg) by mouth daily  - MR Knee Left w/o Contrast; Future  - Orthopedic  Referral; Future    Return if symptoms worsen or fail to improve, for Follow up sports medicine.    Carson Mariee MD  [facility]    Ellen Herrera is a 51 year old who presents for the following health issues    HPI   HPI:   Patient presents with acute left knee pain and swelling after an episode at work where bending down and squatting she felt her knee pop and give way. She has been in exquisite pain since and feels a clicking with extension flexion. She denies any kind of previous orthopedic injury, she denies any mechanical trauma.   Pop? Yes   Swelling? Yes   Limited motion? By pain   Locking/ Catching? Yes    Giving way/ instability? Yes   Imaging? No   Treatment? No      Review of Systems   Constitutional, HEENT, cardiovascular, pulmonary, gi and gu systems are negative, except as otherwise noted.      Objective    /85 (BP Location: Right arm, Patient Position: Chair, Cuff Size: Adult Regular)   Pulse 90   Temp 98.6  F (37  C) (Oral)   SpO2 98%   There is no height or weight on file to calculate  BMI.  Physical Exam   GENERAL: healthy, alert and no distress  EYES: Eyes grossly normal to inspection, PERRL and conjunctivae and sclerae normal  SKIN: no suspicious lesions or rashes  NEURO: Normal strength and tone, mentation intact and speech normal  PSYCH: mentation appears normal, affect normal/bright  MS: no gross musculoskeletal defects noted, no edema  Knee:   ROM: 0-130; Crepitus: Yes   Effusion: Yes ; Swelling: Yes   Strength: Limited by pain in flexion/ extension   Tenderness: Patella - no Medial joint line - no; Lateral joint line - no; Quad tendon - no; Patellar tendon- no; Hamstring - no.   Cruciates: anterior drawer - neg/posterior drawer -neg. Lachman - neg   Collaterals: varus -pos/valgus -neg.   Patella: patellar compression - neg;  Meniscus: Ember - pos; Thessaly - neg     Results for orders placed or performed in visit on 11/19/21 (from the past 24 hour(s))   XR Knee Left 1/2 Views    Narrative    EXAM DATE:         11/19/2021    EXAM: X-RAY KNEE, LEFT, 1-2 VIEWS  LOCATION: [facility]  DATE/TIME: 11/19/2021 4:45 PM    INDICATION: Left knee pain, injury  COMPARISON: None.    IMPRESSION: Normal joint spaces and alignment. No fracture or joint effusion.

## 2021-11-22 ENCOUNTER — TELEPHONE (OUTPATIENT)
Dept: FAMILY MEDICINE | Facility: CLINIC | Age: 51
End: 2021-11-22

## 2021-11-22 NOTE — TELEPHONE ENCOUNTER
Patient came into the office in regards to being seen last Friday 11/19 for urgent care. Carson, was the provider who saw her and is wondering if her restriction can be reworded. On the note it stated that she can not lift anything (0lbs). Her job is stated that she can not work at all if she can't lift. Please call her cell [phone]. Thank you

## 2021-11-22 NOTE — TELEPHONE ENCOUNTER
After reviewing note from Visit, explained to patient she has been referred to Orthopedics for further evaluation. Orthopedics would at this point need to release her from restrictions.  Patient not happy about this but thanked for the call.

## 2021-12-01 ENCOUNTER — HOSPITAL ENCOUNTER (OUTPATIENT)
Dept: MRI IMAGING | Facility: HOSPITAL | Age: 51
Discharge: HOME | End: 2021-12-01
Attending: STUDENT IN AN ORGANIZED HEALTH CARE EDUCATION/TRAINING PROGRAM | Admitting: STUDENT IN AN ORGANIZED HEALTH CARE EDUCATION/TRAINING PROGRAM
Payer: OTHER MISCELLANEOUS

## 2021-12-01 DIAGNOSIS — S89.92XA INJURY OF LEFT KNEE, INITIAL ENCOUNTER: ICD-10-CM

## 2021-12-01 PROCEDURE — 73721 MRI JNT OF LWR EXTRE W/O DYE: CPT | Mod: LT

## 2021-12-03 ENCOUNTER — TRANSFERRED RECORDS (OUTPATIENT)
Dept: HEALTH INFORMATION MANAGEMENT | Facility: CLINIC | Age: 51
End: 2021-12-03

## 2022-09-25 ENCOUNTER — HEALTH MAINTENANCE LETTER (OUTPATIENT)
Age: 52
End: 2022-09-25

## 2023-02-06 ENCOUNTER — OFFICE VISIT (OUTPATIENT)
Dept: FAMILY MEDICINE | Facility: CLINIC | Age: 53
End: 2023-02-06
Payer: COMMERCIAL

## 2023-02-06 VITALS
WEIGHT: 236 LBS | DIASTOLIC BLOOD PRESSURE: 86 MMHG | RESPIRATION RATE: 20 BRPM | TEMPERATURE: 98.1 F | HEART RATE: 94 BPM | OXYGEN SATURATION: 97 % | SYSTOLIC BLOOD PRESSURE: 129 MMHG | BODY MASS INDEX: 34.35 KG/M2

## 2023-02-06 DIAGNOSIS — R05.1 ACUTE COUGH: ICD-10-CM

## 2023-02-06 DIAGNOSIS — R06.2 WHEEZING: ICD-10-CM

## 2023-02-06 DIAGNOSIS — J06.9 VIRAL URI WITH COUGH: Primary | ICD-10-CM

## 2023-02-06 LAB — SARS-COV-2 RNA RESP QL NAA+PROBE: NEGATIVE

## 2023-02-06 PROCEDURE — U0003 INFECTIOUS AGENT DETECTION BY NUCLEIC ACID (DNA OR RNA); SEVERE ACUTE RESPIRATORY SYNDROME CORONAVIRUS 2 (SARS-COV-2) (CORONAVIRUS DISEASE [COVID-19]), AMPLIFIED PROBE TECHNIQUE, MAKING USE OF HIGH THROUGHPUT TECHNOLOGIES AS DESCRIBED BY CMS-2020-01-R: HCPCS | Performed by: NURSE PRACTITIONER

## 2023-02-06 PROCEDURE — U0005 INFEC AGEN DETEC AMPLI PROBE: HCPCS | Performed by: NURSE PRACTITIONER

## 2023-02-06 PROCEDURE — 99213 OFFICE O/P EST LOW 20 MIN: CPT | Mod: CS | Performed by: NURSE PRACTITIONER

## 2023-02-06 RX ORDER — ALBUTEROL SULFATE 90 UG/1
2 AEROSOL, METERED RESPIRATORY (INHALATION) ONCE
Status: COMPLETED | OUTPATIENT
Start: 2023-02-06 | End: 2023-02-06

## 2023-02-06 RX ADMIN — ALBUTEROL SULFATE 2 PUFF: 90 INHALANT RESPIRATORY (INHALATION) at 11:59

## 2023-02-06 ASSESSMENT — ENCOUNTER SYMPTOMS
SORE THROAT: 0
RHINORRHEA: 0
MYALGIAS: 0
CHILLS: 0

## 2023-02-06 NOTE — PATIENT INSTRUCTIONS
Scheduled expectorant such as Mucinex.     When your cough becomes more dry, make sure you have a product available with a cough suppressant in it such as Dayquil/Nyquil.      You are experiencing common virus symptoms. Viruses take 1-2 weeks to resolve on average.      Try over-the-counter cough and cold medication as needed such as Robitussin/Plain Mucinex, ibuprofen for discomfort.    Recheck if high fevers, shortness of breath or not better in about 10 days.    COVID test.  Your results will come to MyChart tomorrow.

## 2023-02-06 NOTE — PROGRESS NOTES
Assessment & Plan     Acute cough    - albuterol (PROVENTIL HFA/VENTOLIN HFA) inhaler  - Symptomatic COVID-19 Virus (Coronavirus) by PCR Nose    Wheezing    - albuterol (PROVENTIL HFA/VENTOLIN HFA) inhaler    Viral URI with cough         Focused exam and history done due to COVID-19 pandemic in a walk-in setting.      History, exam, and vital signs consistent with a viral URI x 2 days.  Did do a trial of one-time dose of albuterol to see if that helped with the sensation of not being able to cough up mucus.  Patient reports feeling like she may have been wheezing earlier.  This did not change her symptoms.    Lungs are clear.  No fevers.  Do not suspect pneumonia.    Scheduled expectorants discussed.    Isolate pending covid results.      Recheck if shortness of breath or new fevers develop.  Rest.  Push fluids.    OTCs recommended: None [   ].  Dextromethorphan  [ x ], guaifenesin [x  ], pseudoephedrine [   ], Afrin for no greater than 3 days [  ], Tylenol or ibuprofen [ x ].                Return in about 10 days (around 2/16/2023) for If no better.    Liz Simpson, [facility] GRAY Herrera is a 52 year old female who presents to clinic today for the following health issues:  Chief Complaint   Patient presents with     Chest Pain     Pt states started about 2 day right side chest back  pain,wheezing and feel mucus stuck on chest      HPI    Cough, chest congestion for 2 days.      Feels wheezy.  No h/o smoking or asthma.      No fever.      No ill contacts.      No home  covid test. No recent covid.           Review of Systems   Constitutional: Negative for chills.   HENT: Positive for congestion. Negative for ear pain, rhinorrhea and sore throat.    Musculoskeletal: Negative for myalgias.           Objective    /86 (BP Location: Right arm, Patient Position: Sitting, Cuff Size: Adult Regular)   Pulse 94   Temp 98.1  F (36.7  C) (Oral)   Resp 20   Wt 107 kg  (236 lb)   SpO2 97%   BMI 34.35 kg/m    Physical Exam  Constitutional:       General: She is not in acute distress.     Appearance: She is well-developed.   HENT:      Nose: Congestion and rhinorrhea present.      Right Sinus: No maxillary sinus tenderness or frontal sinus tenderness.      Left Sinus: No maxillary sinus tenderness or frontal sinus tenderness.   Eyes:      General:         Right eye: No discharge.         Left eye: No discharge.      Conjunctiva/sclera: Conjunctivae normal.   Cardiovascular:      Rate and Rhythm: Normal rate and regular rhythm.      Pulses: Normal pulses.      Heart sounds: Normal heart sounds.   Pulmonary:      Effort: Pulmonary effort is normal.      Breath sounds: Normal breath sounds. No wheezing or rhonchi.      Comments: Wet cough noted during exam.  Musculoskeletal:         General: Normal range of motion.   Skin:     General: Skin is warm and dry.   Neurological:      Mental Status: She is alert and oriented to person, place, and time.   Psychiatric:         Mood and Affect: Mood normal.         Behavior: Behavior normal.         Thought Content: Thought content normal.         Judgment: Judgment normal.

## 2023-10-15 ENCOUNTER — HEALTH MAINTENANCE LETTER (OUTPATIENT)
Age: 53
End: 2023-10-15

## 2024-05-28 ENCOUNTER — OFFICE VISIT (OUTPATIENT)
Dept: FAMILY MEDICINE | Facility: CLINIC | Age: 54
End: 2024-05-28
Payer: COMMERCIAL

## 2024-05-28 VITALS
RESPIRATION RATE: 20 BRPM | DIASTOLIC BLOOD PRESSURE: 84 MMHG | TEMPERATURE: 98.2 F | OXYGEN SATURATION: 100 % | HEART RATE: 96 BPM | SYSTOLIC BLOOD PRESSURE: 141 MMHG

## 2024-05-28 DIAGNOSIS — J06.9 VIRAL UPPER RESPIRATORY INFECTION: Primary | ICD-10-CM

## 2024-05-28 PROCEDURE — 99213 OFFICE O/P EST LOW 20 MIN: CPT | Performed by: PHYSICIAN ASSISTANT

## 2024-05-28 NOTE — PATIENT INSTRUCTIONS
Patient was educated on the natural course of viral illness which typically lasts up to 10 days.  Conservative measures discussed including increased fluids, nasal saline irrigation (neti pot), warm steamy shower, Afrin nasal spray for up to 3 days, expectorants (Mucinex), and analgesics (Tylenol and/or Ibuprofen). See your primary care provider if symptoms worsen or do not improve in 7 days. Seek emergency care if you develop fever over 104 or shortness of breath.

## 2024-05-28 NOTE — PROGRESS NOTES
URGENT CARE VISIT:    SUBJECTIVE:   Sharon Viveros is a 54 year old female presenting with a chief complaint of runny nose, stuffy nose, and facial pain/pressure.  Onset was 1 day(s) ago.   She denies the following symptoms: fever and shortness of breath  Course of illness is same.    Treatment measures tried include Claritin tried with no relief of symptoms.  Predisposing factors include None.    PMH:   Past Medical History:   Diagnosis Date    Normal delivery     Created by Conversion      Allergies: Prochlorperazine edisylate [prochlorperazine]   Medications:   No current outpatient medications on file.     Social History:   Social History     Tobacco Use    Smoking status: Never    Smokeless tobacco: Never   Substance Use Topics    Alcohol use: Not on file       ROS:  Review of systems negative except as stated above.    OBJECTIVE:  BP (!) 141/84 (BP Location: Right arm, Patient Position: Sitting, Cuff Size: Adult Regular)   Pulse 96   Temp 98.2  F (36.8  C) (Oral)   Resp 20   SpO2 100%   GENERAL APPEARANCE: healthy, alert and no distress  EYES: EOMI,  PERRL, conjunctiva clear  HENT: ear canals and TM's normal.  Nose and mouth without ulcers, erythema or lesions  NECK: supple, nontender, no lymphadenopathy  RESP: lungs clear to auscultation - no rales, rhonchi or wheezes  CV: regular rates and rhythm, normal S1 S2, no murmur noted  SKIN: no suspicious lesions or rashes      ASSESSMENT:    ICD-10-CM    1. Viral upper respiratory infection  J06.9           PLAN:  Patient Instructions   Patient was educated on the natural course of viral illness which typically lasts up to 10 days.  Conservative measures discussed including increased fluids, nasal saline irrigation (neti pot), warm steamy shower, Afrin nasal spray for up to 3 days, expectorants (Mucinex), and analgesics (Tylenol and/or Ibuprofen). See your primary care provider if symptoms worsen or do not improve in 7 days. Seek emergency care if you  develop fever over 104 or shortness of breath.    Patient verbalized understanding and is agreeable to plan. The patient was discharged ambulatory and in stable condition.    Eugenia Dye PA-C ....................  5/28/2024   2:37 PM

## 2024-09-04 ENCOUNTER — OFFICE VISIT (OUTPATIENT)
Dept: FAMILY MEDICINE | Facility: CLINIC | Age: 54
End: 2024-09-04
Payer: COMMERCIAL

## 2024-09-04 VITALS
RESPIRATION RATE: 20 BRPM | OXYGEN SATURATION: 98 % | HEART RATE: 74 BPM | SYSTOLIC BLOOD PRESSURE: 157 MMHG | DIASTOLIC BLOOD PRESSURE: 94 MMHG | TEMPERATURE: 98 F

## 2024-09-04 DIAGNOSIS — M54.42 ACUTE LEFT-SIDED LOW BACK PAIN WITH LEFT-SIDED SCIATICA: Primary | ICD-10-CM

## 2024-09-04 PROCEDURE — 99214 OFFICE O/P EST MOD 30 MIN: CPT | Mod: 25 | Performed by: PHYSICIAN ASSISTANT

## 2024-09-04 PROCEDURE — 96372 THER/PROPH/DIAG INJ SC/IM: CPT | Performed by: PHYSICIAN ASSISTANT

## 2024-09-04 RX ORDER — CYCLOBENZAPRINE HCL 10 MG
10 TABLET ORAL
Qty: 14 TABLET | Refills: 0 | Status: SHIPPED | OUTPATIENT
Start: 2024-09-04 | End: 2024-09-18

## 2024-09-04 RX ORDER — KETOROLAC TROMETHAMINE 30 MG/ML
15 INJECTION, SOLUTION INTRAMUSCULAR; INTRAVENOUS ONCE
Status: COMPLETED | OUTPATIENT
Start: 2024-09-04 | End: 2024-09-04

## 2024-09-04 RX ORDER — MELOXICAM 15 MG/1
15 TABLET ORAL DAILY
Qty: 14 TABLET | Refills: 0 | Status: SHIPPED | OUTPATIENT
Start: 2024-09-04 | End: 2024-09-18

## 2024-09-04 RX ADMIN — KETOROLAC TROMETHAMINE 15 MG: 30 INJECTION, SOLUTION INTRAMUSCULAR; INTRAVENOUS at 13:33

## 2024-09-04 NOTE — PATIENT INSTRUCTIONS
Your back pain from left sciatica.     I have prescribed you a short course of antiinflammatory to help decrease the inflammation in the area of the spasm. This should help to decrease associated numbness/tingling (nerve pain). Please take as directed.    Do not take OTC ibuprofen or NSAIDs while on the Mobic. If having pain, take Tylenol up to 1,000mg, 4 times daily.    You may use the Flexeril as needed for muscle spasm. Use caution while taking this medication, as it can make you drowsy. Do not take while driving, operating heavy machinery, or doing any activities requiring intense concentration.    Try using a heating pad and/or warm baths.    Make sure to keep moving to avoid getting stiff. See below for stretching exercises.    If you develop fever, severe pain that prevents you from walking at all, weakness of your arms or legs, loss of bowel or bladder continence, or any other new concerning symptoms, go to the ER immediately.    Otherwise, follow up with primary care doctor as needed or if no improvement in pain in symptoms in 1 week.

## 2024-09-04 NOTE — LETTER
September 4, 2024      Sharon Viveros  [address] [place] [id number]        To Whom It May Concern:    Sharon Viveros  was seen on 09/04/24.  Please excuse her  until 9/6/24 due to illness.        Sincerely,        Jesus Manuel Price PA-C

## 2024-09-04 NOTE — PROGRESS NOTES
Patient presents with:  Musculoskeletal Problem: Lower back pain no injury   Pt thinks is a pinched nerve       Clinical Decision Making:  Patient is treated with Toradol 15 mg IM in the office.  It did help with some of the pain but did not completely relieve it.  Treatment with anti-inflammatory with Mobic once daily and use of Flexeril for treatment of the left sciatica.  Patient is cautioned regarding impairment and sedation.  Work note is written. Expected course of resolution and indication for return was gone over and questions were answered to patient/parent's satisfaction before discharge.    30 min spent on the date of the encounter in chart review, patient visit, review of tests, documentation and/ordiscussion with other providers about the issues documented above.        ICD-10-CM    1. Acute left-sided low back pain with left-sided sciatica  M54.42 ketorolac (TORADOL) injection 15 mg     meloxicam (MOBIC) 15 MG tablet     cyclobenzaprine (FLEXERIL) 10 MG tablet          Patient Instructions     Your back pain from left sciatica.     I have prescribed you a short course of antiinflammatory to help decrease the inflammation in the area of the spasm. This should help to decrease associated numbness/tingling (nerve pain). Please take as directed.    Do not take OTC ibuprofen or NSAIDs while on the Mobic. If having pain, take Tylenol up to 1,000mg, 4 times daily.    You may use the Flexeril as needed for muscle spasm. Use caution while taking this medication, as it can make you drowsy. Do not take while driving, operating heavy machinery, or doing any activities requiring intense concentration.    Try using a heating pad and/or warm baths.    Make sure to keep moving to avoid getting stiff. See below for stretching exercises.    If you develop fever, severe pain that prevents you from walking at all, weakness of your arms or legs, loss of bowel or bladder continence, or any other new concerning symptoms, go  to the ER immediately.    Otherwise, follow up with primary care doctor as needed or if no improvement in pain in symptoms in 1 week.            HPI:  Sharon Viveros is a 54 year old female who presents today for evaluation of left lower lumbar back pain that is radiating into the buttock and into the leg.  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  She felt and heard a pop in the left lower back.  Subsequently she has had pain that is in 8 out of 10.  It is keeping her awake at night.  Movement going from lying to sitting and sitting to standing and changing position is the most painful for the patient.  She does find some relief with sitting standing or lying still and not moving.  Patient denies fecal or urinary incontinence and no fever.  She has not had any weakness in the left lower extremity.  She is tried topical heat on the lower back without relief.  Patient has not had a prior history of back problems previously.  Patient is through menopause and denies pregnancy.  She does request a note for work since she missed work yesterday and today.    History obtained from chart review and the patient.    Problem List:  2018-08: Right foot pain  Esophageal Reflux  Chest Pain  Cervical Dysplasia  Sprain Of The Left Hip  Itching (Pruritus)  Normal delivery      Past Medical History:   Diagnosis Date    Normal delivery     Created by Conversion        Social History     Tobacco Use    Smoking status: Never    Smokeless tobacco: Never   Substance Use Topics    Alcohol use: Not on file       Review of Systems  As above in HPI otherwise negative.    Vitals:    09/04/24 1224   BP: (!) 157/94   Pulse: 74   Resp: 20   Temp: 98  F (36.7  C)   SpO2: 98%     General: Patient is uncomfortable in the office encounter secondary to pain.  Patient ambulates into the office encounter is able to walk into the office encounter sit in the chair stand up and walk to the  examination table.    Musculoskeletal:  No noted deformities and the alignment is midline.    Palpation: No cervical thoracic lumbar sacral spinous process tenderness to palpation  Paraspinal muscles are palpated.    There is paraspinal muscle tenderness on the left side.  SI joint on the left side is tender to palpation and does reproduce symptoms radiates into the buttock into the lateral aspect of the thigh to the mid thigh region but stops and does not go down to the foot.    Range of motion: She has extension to 20 degrees and flexion to 90 degrees although touching toes with flexion is painful for the patient and does reproduce symptoms    Reflexes: DTRs are 2 out of 4 and equal bilaterally at the Achilles and patella.    Musculoskeletal strength: 5 out of 5 and equal bilaterally.  Seated straight leg raises reproduce pain on the left side  Patient is able to arise on the heels as well as balls of the feet.       Physical Exam      At the end of the encounter, I discussed results, diagnosis, medications. Discussed red flags for immediate return to clinic/ER, as well as indications for follow up if no improvement. Patient understood and agreed to plan. Patient was stable for discharge.

## 2024-12-07 ENCOUNTER — HEALTH MAINTENANCE LETTER (OUTPATIENT)
Age: 54
End: 2024-12-07

## 2025-01-06 ENCOUNTER — TRANSFERRED RECORDS (OUTPATIENT)
Dept: HEALTH INFORMATION MANAGEMENT | Facility: CLINIC | Age: 55
End: 2025-01-06
Payer: COMMERCIAL

## 2025-02-21 ENCOUNTER — TRANSFERRED RECORDS (OUTPATIENT)
Dept: HEALTH INFORMATION MANAGEMENT | Facility: CLINIC | Age: 55
End: 2025-02-21
Payer: COMMERCIAL

## 2025-03-03 ENCOUNTER — TRANSFERRED RECORDS (OUTPATIENT)
Dept: HEALTH INFORMATION MANAGEMENT | Facility: CLINIC | Age: 55
End: 2025-03-03
Payer: COMMERCIAL